# Patient Record
Sex: FEMALE | Race: WHITE | Employment: UNEMPLOYED | URBAN - METROPOLITAN AREA
[De-identification: names, ages, dates, MRNs, and addresses within clinical notes are randomized per-mention and may not be internally consistent; named-entity substitution may affect disease eponyms.]

---

## 2018-09-28 LAB
ANTIBODY SCREEN, EXTERNAL: NEGATIVE
CHLAMYDIA, EXTERNAL: NEGATIVE
HBSAG, EXTERNAL: NEGATIVE
HIV, EXTERNAL: NON REACTIVE
N. GONORRHEA, EXTERNAL: NEGATIVE
RUBELLA, EXTERNAL: NORMAL
T. PALLIDUM, EXTERNAL: NEGATIVE

## 2019-04-10 LAB — GRBS, EXTERNAL: NEGATIVE

## 2019-04-29 ENCOUNTER — APPOINTMENT (OUTPATIENT)
Dept: VASCULAR SURGERY | Age: 34
End: 2019-04-29
Attending: OBSTETRICS & GYNECOLOGY
Payer: COMMERCIAL

## 2019-04-29 ENCOUNTER — HOSPITAL ENCOUNTER (INPATIENT)
Age: 34
LOS: 3 days | Discharge: HOME | End: 2019-05-02
Attending: OBSTETRICS & GYNECOLOGY | Admitting: OBSTETRICS & GYNECOLOGY
Payer: COMMERCIAL

## 2019-04-29 ENCOUNTER — ANESTHESIA EVENT (OUTPATIENT)
Dept: LABOR AND DELIVERY | Age: 34
End: 2019-04-29
Payer: COMMERCIAL

## 2019-04-29 ENCOUNTER — ANESTHESIA (OUTPATIENT)
Dept: LABOR AND DELIVERY | Age: 34
End: 2019-04-29
Payer: COMMERCIAL

## 2019-04-29 DIAGNOSIS — G89.18 POSTOPERATIVE PAIN: Primary | ICD-10-CM

## 2019-04-29 PROBLEM — Z34.90 PREGNANCY: Status: ACTIVE | Noted: 2019-04-29

## 2019-04-29 LAB
ABO + RH BLD: NORMAL
BASOPHILS # BLD: 0 K/UL (ref 0–0.1)
BASOPHILS NFR BLD: 0 % (ref 0–1)
BLOOD GROUP ANTIBODIES SERPL: NORMAL
DAILY QC (YES/NO)?: YES
DIFFERENTIAL METHOD BLD: ABNORMAL
EOSINOPHIL # BLD: 0.1 K/UL (ref 0–0.4)
EOSINOPHIL NFR BLD: 1 % (ref 0–7)
ERYTHROCYTE [DISTWIDTH] IN BLOOD BY AUTOMATED COUNT: 13.5 % (ref 11.5–14.5)
HCT VFR BLD AUTO: 33 % (ref 35–47)
HGB BLD-MCNC: 10.9 G/DL (ref 11.5–16)
IMM GRANULOCYTES # BLD AUTO: 0.1 K/UL (ref 0–0.04)
IMM GRANULOCYTES NFR BLD AUTO: 1 % (ref 0–0.5)
LYMPHOCYTES # BLD: 2.1 K/UL (ref 0.8–3.5)
LYMPHOCYTES NFR BLD: 22 % (ref 12–49)
MCH RBC QN AUTO: 31.2 PG (ref 26–34)
MCHC RBC AUTO-ENTMCNC: 33 G/DL (ref 30–36.5)
MCV RBC AUTO: 94.6 FL (ref 80–99)
MONOCYTES # BLD: 0.8 K/UL (ref 0–1)
MONOCYTES NFR BLD: 9 % (ref 5–13)
NEUTS SEG # BLD: 6.4 K/UL (ref 1.8–8)
NEUTS SEG NFR BLD: 67 % (ref 32–75)
NRBC # BLD: 0 K/UL (ref 0–0.01)
NRBC BLD-RTO: 0 PER 100 WBC
PH, VAGINAL FLUID: 6.5 (ref 5–6.1)
PLATELET # BLD AUTO: 183 K/UL (ref 150–400)
PMV BLD AUTO: 11.5 FL (ref 8.9–12.9)
RBC # BLD AUTO: 3.49 M/UL (ref 3.8–5.2)
SPECIMEN EXP DATE BLD: NORMAL
WBC # BLD AUTO: 9.5 K/UL (ref 3.6–11)

## 2019-04-29 PROCEDURE — 85025 COMPLETE CBC W/AUTO DIFF WBC: CPT

## 2019-04-29 PROCEDURE — 76010000392 HC C SECN EA ADDL 0.5 HR: Performed by: OBSTETRICS & GYNECOLOGY

## 2019-04-29 PROCEDURE — 75410000003 HC RECOV DEL/VAG/CSECN EA 0.5 HR: Performed by: OBSTETRICS & GYNECOLOGY

## 2019-04-29 PROCEDURE — 36415 COLL VENOUS BLD VENIPUNCTURE: CPT

## 2019-04-29 PROCEDURE — 86900 BLOOD TYPING SEROLOGIC ABO: CPT

## 2019-04-29 PROCEDURE — 74011250637 HC RX REV CODE- 250/637: Performed by: OBSTETRICS & GYNECOLOGY

## 2019-04-29 PROCEDURE — 74011000250 HC RX REV CODE- 250

## 2019-04-29 PROCEDURE — 75810000275 HC EMERGENCY DEPT VISIT NO LEVEL OF CARE

## 2019-04-29 PROCEDURE — 77030007866 HC KT SPN ANES BBMI -B: Performed by: ANESTHESIOLOGY

## 2019-04-29 PROCEDURE — 76060000078 HC EPIDURAL ANESTHESIA: Performed by: OBSTETRICS & GYNECOLOGY

## 2019-04-29 PROCEDURE — 74011250636 HC RX REV CODE- 250/636: Performed by: OBSTETRICS & GYNECOLOGY

## 2019-04-29 PROCEDURE — 74011250636 HC RX REV CODE- 250/636

## 2019-04-29 PROCEDURE — 83986 ASSAY PH BODY FLUID NOS: CPT | Performed by: OBSTETRICS & GYNECOLOGY

## 2019-04-29 PROCEDURE — 74011250636 HC RX REV CODE- 250/636: Performed by: ANESTHESIOLOGY

## 2019-04-29 PROCEDURE — 77030027138 HC INCENT SPIROMETER -A

## 2019-04-29 PROCEDURE — 65270000029 HC RM PRIVATE

## 2019-04-29 PROCEDURE — 75410000002 HC LABOR FEE PER 1 HR: Performed by: OBSTETRICS & GYNECOLOGY

## 2019-04-29 PROCEDURE — 76010000391 HC C SECN FIRST 1 HR: Performed by: OBSTETRICS & GYNECOLOGY

## 2019-04-29 PROCEDURE — 93971 EXTREMITY STUDY: CPT

## 2019-04-29 RX ORDER — CEFAZOLIN SODIUM/WATER 2 G/20 ML
2 SYRINGE (ML) INTRAVENOUS
Status: COMPLETED | OUTPATIENT
Start: 2019-04-29 | End: 2019-04-29

## 2019-04-29 RX ORDER — SODIUM CHLORIDE 0.9 % (FLUSH) 0.9 %
5-40 SYRINGE (ML) INJECTION AS NEEDED
Status: DISCONTINUED | OUTPATIENT
Start: 2019-04-29 | End: 2019-04-29 | Stop reason: HOSPADM

## 2019-04-29 RX ORDER — NALOXONE HYDROCHLORIDE 0.4 MG/ML
0.4 INJECTION, SOLUTION INTRAMUSCULAR; INTRAVENOUS; SUBCUTANEOUS AS NEEDED
Status: DISCONTINUED | OUTPATIENT
Start: 2019-04-29 | End: 2019-05-02 | Stop reason: HOSPADM

## 2019-04-29 RX ORDER — CEFAZOLIN SODIUM 1 G/3ML
INJECTION, POWDER, FOR SOLUTION INTRAMUSCULAR; INTRAVENOUS AS NEEDED
Status: DISCONTINUED | OUTPATIENT
Start: 2019-04-29 | End: 2019-04-29 | Stop reason: HOSPADM

## 2019-04-29 RX ORDER — SIMETHICONE 80 MG
80 TABLET,CHEWABLE ORAL AS NEEDED
Status: DISCONTINUED | OUTPATIENT
Start: 2019-04-29 | End: 2019-05-02 | Stop reason: HOSPADM

## 2019-04-29 RX ORDER — SODIUM CHLORIDE 0.9 % (FLUSH) 0.9 %
5-40 SYRINGE (ML) INJECTION EVERY 8 HOURS
Status: DISCONTINUED | OUTPATIENT
Start: 2019-04-29 | End: 2019-05-02 | Stop reason: HOSPADM

## 2019-04-29 RX ORDER — SWAB
1 SWAB, NON-MEDICATED MISCELLANEOUS DAILY
Status: DISCONTINUED | OUTPATIENT
Start: 2019-04-29 | End: 2019-05-02 | Stop reason: HOSPADM

## 2019-04-29 RX ORDER — HYDROMORPHONE HYDROCHLORIDE 2 MG/ML
1 INJECTION, SOLUTION INTRAMUSCULAR; INTRAVENOUS; SUBCUTANEOUS
Status: DISCONTINUED | OUTPATIENT
Start: 2019-04-29 | End: 2019-05-02 | Stop reason: HOSPADM

## 2019-04-29 RX ORDER — SODIUM CHLORIDE, SODIUM LACTATE, POTASSIUM CHLORIDE, CALCIUM CHLORIDE 600; 310; 30; 20 MG/100ML; MG/100ML; MG/100ML; MG/100ML
125 INJECTION, SOLUTION INTRAVENOUS CONTINUOUS
Status: DISCONTINUED | OUTPATIENT
Start: 2019-04-29 | End: 2019-05-02 | Stop reason: HOSPADM

## 2019-04-29 RX ORDER — ONDANSETRON 2 MG/ML
4 INJECTION INTRAMUSCULAR; INTRAVENOUS
Status: DISCONTINUED | OUTPATIENT
Start: 2019-04-29 | End: 2019-05-02 | Stop reason: HOSPADM

## 2019-04-29 RX ORDER — SODIUM CHLORIDE, SODIUM LACTATE, POTASSIUM CHLORIDE, CALCIUM CHLORIDE 600; 310; 30; 20 MG/100ML; MG/100ML; MG/100ML; MG/100ML
INJECTION, SOLUTION INTRAVENOUS
Status: DISCONTINUED | OUTPATIENT
Start: 2019-04-29 | End: 2019-04-29 | Stop reason: HOSPADM

## 2019-04-29 RX ORDER — NALBUPHINE HYDROCHLORIDE 10 MG/ML
5 INJECTION, SOLUTION INTRAMUSCULAR; INTRAVENOUS; SUBCUTANEOUS ONCE
Status: ACTIVE | OUTPATIENT
Start: 2019-04-29 | End: 2019-04-29

## 2019-04-29 RX ORDER — SODIUM CHLORIDE 0.9 % (FLUSH) 0.9 %
5-40 SYRINGE (ML) INJECTION EVERY 8 HOURS
Status: DISCONTINUED | OUTPATIENT
Start: 2019-04-29 | End: 2019-04-29 | Stop reason: HOSPADM

## 2019-04-29 RX ORDER — DOCUSATE SODIUM 100 MG/1
100 CAPSULE, LIQUID FILLED ORAL 2 TIMES DAILY
Status: DISCONTINUED | OUTPATIENT
Start: 2019-04-29 | End: 2019-05-02 | Stop reason: HOSPADM

## 2019-04-29 RX ORDER — SODIUM CHLORIDE, SODIUM LACTATE, POTASSIUM CHLORIDE, CALCIUM CHLORIDE 600; 310; 30; 20 MG/100ML; MG/100ML; MG/100ML; MG/100ML
1000 INJECTION, SOLUTION INTRAVENOUS CONTINUOUS
Status: DISCONTINUED | OUTPATIENT
Start: 2019-04-29 | End: 2019-04-29 | Stop reason: HOSPADM

## 2019-04-29 RX ORDER — NALOXONE HYDROCHLORIDE 0.4 MG/ML
0.2 INJECTION, SOLUTION INTRAMUSCULAR; INTRAVENOUS; SUBCUTANEOUS
Status: DISCONTINUED | OUTPATIENT
Start: 2019-04-29 | End: 2019-05-02 | Stop reason: HOSPADM

## 2019-04-29 RX ORDER — OXYTOCIN/RINGER'S LACTATE 20/1000 ML
125-500 PLASTIC BAG, INJECTION (ML) INTRAVENOUS ONCE
Status: COMPLETED | OUTPATIENT
Start: 2019-04-29 | End: 2019-04-29

## 2019-04-29 RX ORDER — IBUPROFEN 800 MG/1
800 TABLET ORAL EVERY 8 HOURS
Status: DISCONTINUED | OUTPATIENT
Start: 2019-04-29 | End: 2019-05-02 | Stop reason: HOSPADM

## 2019-04-29 RX ORDER — KETOROLAC TROMETHAMINE 30 MG/ML
30 INJECTION, SOLUTION INTRAMUSCULAR; INTRAVENOUS
Status: DISPENSED | OUTPATIENT
Start: 2019-04-29 | End: 2019-04-30

## 2019-04-29 RX ORDER — SODIUM CHLORIDE 0.9 % (FLUSH) 0.9 %
5-40 SYRINGE (ML) INJECTION AS NEEDED
Status: DISCONTINUED | OUTPATIENT
Start: 2019-04-29 | End: 2019-05-02 | Stop reason: HOSPADM

## 2019-04-29 RX ORDER — OXYTOCIN 10 [USP'U]/ML
INJECTION, SOLUTION INTRAMUSCULAR; INTRAVENOUS AS NEEDED
Status: DISCONTINUED | OUTPATIENT
Start: 2019-04-29 | End: 2019-04-29 | Stop reason: HOSPADM

## 2019-04-29 RX ORDER — BUPIVACAINE HYDROCHLORIDE 7.5 MG/ML
INJECTION, SOLUTION EPIDURAL; RETROBULBAR
Status: SHIPPED | OUTPATIENT
Start: 2019-04-29 | End: 2019-04-29

## 2019-04-29 RX ORDER — ACETAMINOPHEN 325 MG/1
650 TABLET ORAL
Status: DISCONTINUED | OUTPATIENT
Start: 2019-04-29 | End: 2019-05-02 | Stop reason: HOSPADM

## 2019-04-29 RX ORDER — DIPHENHYDRAMINE HYDROCHLORIDE 50 MG/ML
12.5 INJECTION, SOLUTION INTRAMUSCULAR; INTRAVENOUS
Status: DISCONTINUED | OUTPATIENT
Start: 2019-04-29 | End: 2019-05-02 | Stop reason: HOSPADM

## 2019-04-29 RX ORDER — ZOLPIDEM TARTRATE 5 MG/1
5 TABLET ORAL
Status: DISCONTINUED | OUTPATIENT
Start: 2019-04-29 | End: 2019-05-02 | Stop reason: HOSPADM

## 2019-04-29 RX ORDER — MORPHINE SULFATE 1 MG/ML
INJECTION, SOLUTION EPIDURAL; INTRATHECAL; INTRAVENOUS
Status: SHIPPED | OUTPATIENT
Start: 2019-04-29 | End: 2019-04-29

## 2019-04-29 RX ORDER — HYDROCODONE BITARTRATE AND ACETAMINOPHEN 5; 325 MG/1; MG/1
1 TABLET ORAL
Status: DISCONTINUED | OUTPATIENT
Start: 2019-04-29 | End: 2019-05-02 | Stop reason: HOSPADM

## 2019-04-29 RX ADMIN — SODIUM CHLORIDE, SODIUM LACTATE, POTASSIUM CHLORIDE, CALCIUM CHLORIDE: 600; 310; 30; 20 INJECTION, SOLUTION INTRAVENOUS at 03:42

## 2019-04-29 RX ADMIN — BUPIVACAINE HYDROCHLORIDE 12 MG: 7.5 INJECTION, SOLUTION EPIDURAL; RETROBULBAR at 03:34

## 2019-04-29 RX ADMIN — SODIUM CHLORIDE, SODIUM LACTATE, POTASSIUM CHLORIDE, CALCIUM CHLORIDE: 600; 310; 30; 20 INJECTION, SOLUTION INTRAVENOUS at 04:23

## 2019-04-29 RX ADMIN — ONDANSETRON 4 MG: 2 INJECTION INTRAMUSCULAR; INTRAVENOUS at 05:15

## 2019-04-29 RX ADMIN — Medication 2500 MILLI-UNITS/HR: at 07:24

## 2019-04-29 RX ADMIN — KETOROLAC TROMETHAMINE 30 MG: 30 INJECTION, SOLUTION INTRAMUSCULAR at 05:23

## 2019-04-29 RX ADMIN — SODIUM CHLORIDE, SODIUM LACTATE, POTASSIUM CHLORIDE, CALCIUM CHLORIDE: 600; 310; 30; 20 INJECTION, SOLUTION INTRAVENOUS at 03:15

## 2019-04-29 RX ADMIN — MORPHINE SULFATE 0.25 MG: 1 INJECTION, SOLUTION EPIDURAL; INTRATHECAL; INTRAVENOUS at 03:34

## 2019-04-29 RX ADMIN — Medication 2 G: at 03:06

## 2019-04-29 RX ADMIN — ONDANSETRON 4 MG: 2 INJECTION INTRAMUSCULAR; INTRAVENOUS at 13:26

## 2019-04-29 RX ADMIN — ONDANSETRON 4 MG: 2 INJECTION INTRAMUSCULAR; INTRAVENOUS at 09:12

## 2019-04-29 RX ADMIN — IBUPROFEN 800 MG: 800 TABLET ORAL at 19:48

## 2019-04-29 RX ADMIN — CEFAZOLIN SODIUM 2 G: 1 INJECTION, POWDER, FOR SOLUTION INTRAMUSCULAR; INTRAVENOUS at 03:15

## 2019-04-29 RX ADMIN — OXYTOCIN 20 UNITS: 10 INJECTION, SOLUTION INTRAMUSCULAR; INTRAVENOUS at 03:52

## 2019-04-29 NOTE — L&D DELIVERY NOTE
Delivery Summary    Patient: Anna Barlow MRN: [id number]  SSN: [id number]    YOB: 1985  Age: 35 y.o. Sex: female        Information for the patient's :  Karely Almodovar [[id number]]       Labor Events:    Labor: No    Steroids:     Cervical Ripening Date/Time:       Cervical Ripening Type:     Antibiotics During Labor:     Rupture Identifier:      Rupture Date/Time: 2019 12:30 AM   Rupture Type: SROM   Amniotic Fluid Volume: Moderate    Amniotic Fluid Description: Clear    Amniotic Fluid Odor: None    Induction: None       Induction Date/Time:        Indications for Induction:      Augmentation: None   Augmentation Date/Time:      Indications for Augmentation:     Labor complications: Additional complications:        Delivery Events:  Indications For Episiotomy:     Episiotomy: None   Perineal Laceration(s): None   Repaired:     Periurethral Laceration Location:      Repaired:     Labial Laceration Location:     Repaired:     Sulcal Laceration Location:     Repaired:     Vaginal Laceration Location:     Repaired:     Cervical Laceration Location:     Repaired:     Repair Suture:     Number of Repair Packets:     Estimated Blood Loss (ml):  ml     Delivery Date: 2019    Delivery Time: 3:52 AM   Delivery Type: , Low Transverse     Details    Trial of Labor: No   Primary/Repeat: Repeat   Priority: Routine   Indications:          Sex:  Male     Gestational Age: 39w0d  Delivery Clinician:  Mercedez Smith  Living Status: Living   Delivery Location: OR L&D OR1          APGARS  One minute Five minutes Ten minutes   Skin color:            Heart rate:            Grimace:            Muscle tone:            Breathing: Totals:              Presentation: Vertex    Position:   Occiput Transverse  Resuscitation Method:        Meconium Stained:        Cord Information:    Complications:    Cord around:    Delayed cord clamping?     Cord clamped date/time:   Disposition of Cord Blood:      Blood Gases Sent?:      Placenta:  Date/Time:    Removal:        Appearance:        Measurements:  Birth Weight: 4.025 kg      Birth Length: 50.8 cm      Head Circumference: 36.5 cm      Chest Circumference: 34.5 cm     Abdominal Girth: 33.5 cm    Other Providers:   ASHLEY PEDROZA;MATTHEW MORRISON;JOSELIN GARZA;MATTHEW GALICIA;NEGRITA JUAREZ;CARTER BARRERA, Obstetrician;Primary Nurse;Primary  Nurse;Scrub Tech; Anesthesiologist;Scrub Tech             Group B Strep:   Lab Results   Component Value Date/Time    GrBStrep, External Negative 04/10/2019     Information for the patient's :  Jimenez Pratik, Male Bala Latus [[id number]]   No results found for: ABORH, PCTABR, PCTDIG, BILI, ABORHEXT, ABORH    No results for input(s): PCO2CB, PO2CB, HCO3I, SO2I, IBD, PTEMPI, SPECTI, PHICB, ISITE, IDEV, IALLEN in the last 72 hours. See dictated operative note.

## 2019-04-29 NOTE — ROUTINE PROCESS
Bedside and Verbal shift change report given to Kolby Wilkerson RN (oncoming nurse) by Andrew Gusman RN (offgoing nurse). Report included the following information SBAR, Intake/Output, MAR and Recent Results.

## 2019-04-29 NOTE — PROGRESS NOTES
Post-Operative  Day 0    Samina Ivory     Assessment: Post-Op day 0, stable.  @39wks, RLTCS. +Rh, Rubella Immune. NO Circ. Hx Uterine prolapse w/ G2. Plan:   1. Routine post-operative care       Information for the patient's :  Blanche Ha Male Stacy Mason [[id number]]   , Low Transverse    Patient doing well without significant complaint. Some continued nausea relieved w/ Zofran. Vomiting resolved, tolerating liquids, no flatus, sen in place. Vitals:  Visit Vitals  /66   Pulse 73   Temp 98.2 °F (36.8 °C)   Resp 16   Ht 5' 7\" (1.702 m)   Wt 73.5 kg (162 lb)   SpO2 100%   Breastfeeding? Unknown   BMI 25.37 kg/m²     Temp (24hrs), Av.3 °F (36.8 °C), Min:97.8 °F (36.6 °C), Max:98.6 °F (37 °C)      Last 24hr Input/Output:    Intake/Output Summary (Last 24 hours) at 2019 0758  Last data filed at 2019 0543  Gross per 24 hour   Intake 2000 ml   Output 225 ml   Net 1775 ml          Exam:        Patient without distress. Lungs clear. Abdomen, bowel sounds present, soft, expected tenderness, fundus firm Wound dressing intact     Perineum normal lochia noted               Lower extremities are negative for swelling, cords or tenderness.     Labs:   Lab Results   Component Value Date/Time    WBC 9.5 2019 02:23 AM    WBC 14.0 (H) 2016 02:56 AM    WBC 10.7 2016 08:30 AM    WBC 8.3 2015 11:24 AM    WBC 9.1 2013 01:11 AM    HGB 10.9 (L) 2019 02:23 AM    HGB 10.8 (L) 2016 02:56 AM    HGB 11.3 (L) 2016 08:30 AM    HGB 12.4 2015 11:24 AM    HGB 11.9 2013 01:11 AM    HCT 33.0 (L) 2019 02:23 AM    HCT 32.9 (L) 2016 02:56 AM    HCT 33.8 (L) 2016 08:30 AM    HCT 38.2 2015 11:24 AM    HCT 35.2 2013 01:11 AM    PLATELET 402  02:23 AM    PLATELET 980  02:56 AM    PLATELET 676  08:30 AM    PLATELET 114 12/10/3895 11:24 AM    PLATELET 894  01:11 AM       Recent Results (from the past 24 hour(s))   PH BY NITRAZINE, POC    Collection Time: 04/29/19  1:38 AM   Result Value Ref Range    pH-Nitrazine paper 6.5 (A) 5.0 - 6.1    Daily QC performed? Yes    CBC WITH AUTOMATED DIFF    Collection Time: 04/29/19  2:23 AM   Result Value Ref Range    WBC 9.5 3.6 - 11.0 K/uL    RBC 3.49 (L) 3.80 - 5.20 M/uL    HGB 10.9 (L) 11.5 - 16.0 g/dL    HCT 33.0 (L) 35.0 - 47.0 %    MCV 94.6 80.0 - 99.0 FL    MCH 31.2 26.0 - 34.0 PG    MCHC 33.0 30.0 - 36.5 g/dL    RDW 13.5 11.5 - 14.5 %    PLATELET 138 791 - 617 K/uL    MPV 11.5 8.9 - 12.9 FL    NRBC 0.0 0  WBC    ABSOLUTE NRBC 0.00 0.00 - 0.01 K/uL    NEUTROPHILS 67 32 - 75 %    LYMPHOCYTES 22 12 - 49 %    MONOCYTES 9 5 - 13 %    EOSINOPHILS 1 0 - 7 %    BASOPHILS 0 0 - 1 %    IMMATURE GRANULOCYTES 1 (H) 0.0 - 0.5 %    ABS. NEUTROPHILS 6.4 1.8 - 8.0 K/UL    ABS. LYMPHOCYTES 2.1 0.8 - 3.5 K/UL    ABS. MONOCYTES 0.8 0.0 - 1.0 K/UL    ABS. EOSINOPHILS 0.1 0.0 - 0.4 K/UL    ABS. BASOPHILS 0.0 0.0 - 0.1 K/UL    ABS. IMM.  GRANS. 0.1 (H) 0.00 - 0.04 K/UL    DF AUTOMATED     TYPE & SCREEN    Collection Time: 04/29/19  2:23 AM   Result Value Ref Range    Crossmatch Expiration 05/02/2019     ABO/Rh(D) Aleisha Stai POSITIVE     Antibody screen NEG      Olesya Ramirez NP

## 2019-04-29 NOTE — PROGRESS NOTES
Nurse notified pt that MD wants her to keep her SCD's on while in bed and overnight. MD also wants pt to wear JEVON hose. Pt  Refused SCD's and JEVON hose. She states she doesn't like the SCD's because they make her legs \"itchy\" and so will the JEVON hose. JEVON hose placed at pts bedside and she will let nurse know if she changes her mind.

## 2019-04-29 NOTE — PROGRESS NOTES
Nurse called and spoke to Dr. Edward Schulz, notified him that preliminary doppler results are negative. OK to get pt OOB. Pt to use SCD's overnight, order for JEVON hose received.

## 2019-04-29 NOTE — ROUTINE PROCESS
TRANSFER - IN REPORT:    Verbal report received from Emelia Stephens RN(name) on Princess Maldonado  being received from L&D(unit) for routine progression of care      Report consisted of patients Situation, Background, Assessment and   Recommendations(SBAR). Information from the following report(s) SBAR, Intake/Output and MAR was reviewed with the receiving nurse. Opportunity for questions and clarification was provided. Assessment completed upon patients arrival to unit and care assumed. Pt received to room #315 via stretcher with infant and her family.

## 2019-04-29 NOTE — H&P
History & Physical    Name: Duc Hunter MRN: [id number]  SSN: [id number]    YOB: 1985  Age: 35 y.o. Sex: female        Subjective:     Estimated Date of Delivery: 19  OB History    Para Term  AB Living   3 2 2     2   SAB TAB Ectopic Molar Multiple Live Births           0 2      # Outcome Date GA Lbr Madi/2nd Weight Sex Delivery Anes PTL Lv   3 Current            2 Term 16 39w0d  3.742 kg F  LO SPINAL AN N EVANGELISTA   1 Term 13 39w0d  3.52 kg Rico Crest AN  EVANGELISTA      Obstetric Comments   Dr. Diaz [facility]       Ms. Mary Lou Gooden is a G3 [phone] with pregnancy at 39w0d h/o previous  section with leakage of vaginal fluid that started at 0030 and irregular uterine contractions q 10-14 minutes. Light spotting after SVE. She is scheduled for a repeat  today at 0930. Prenatal course is complicated by h/o previous , uterine prolapse with her second pregnancy requiring a pessary. She has been doing physical therapy with this pregnancy and has not had any issues with prolapse. and is scheduled for a repeat  today at 0930. Mentions that she would have considered TOLAC if she were 8+ centimeters but otherwise desires . She's a retired NICU nurse and desires not want to wait until 0930 as she is wants to make sure that her  is performed in a controlled environment. Please see prenatal records for details. Past Medical History:   Diagnosis Date    Tachycardia     Unspecified breast disorder 2015    lumpectomy  right breast cyst    Unspecified breast disorder     axillary node dissection    Unspecified breast disorder     left breast biopsy    Unspecified breast disorder     BRCA test negative    Uterine prolapse affecting pregnancy in second trimester, antepartum 9/3/2015    1 degree cystocele and descent of cervix to introitus with prolonged standing and straining.  Fitted with #4 Ring pessary with support     Past Surgical History:   Procedure Laterality Date    BREAST SURGERY PROCEDURE UNLISTED      HX ACL RECONSTRUCTION      HX  SECTION      HX TONSILLECTOMY       Social History     Occupational History    Occupation: Stay at home mom     Comment: retired NICU nurse   Tobacco Use    Smoking status: Never Smoker    Smokeless tobacco: Never Used   Substance and Sexual Activity    Alcohol use: No    Drug use: Never    Sexual activity: Yes     Partners: Male     Birth control/protection: None     Family History   Problem Relation Age of Onset    Breast Cancer Mother 45         age 36    Breast Cancer Maternal Grandmother 36        reoccured 2-3 times    Other Father 36        parkinsons    Other Brother         bipolar    Hypertension Maternal Grandfather 46        MI    Diabetes Paternal Grandmother 66        unsure of diabete type; liver disease       Allergies   Allergen Reactions    Shellfish Derived Anaphylaxis    Codeine Sulfate Hives     Pt states she is not allergic      Morphine Hives     Pt states NOT an allergy    Oxycodone Itching     Pt states NOT allergic    Soy Other (comments)     GI upset     Prior to Admission medications    Medication Sig Start Date End Date Taking? Authorizing Provider   LACTOBACILLUS ACIDOPHILUS (PROBIOTIC PO) Take  by mouth. Yes Provider, Historical   loratadine (CLARITIN) 10 mg tablet Take 10 mg by mouth daily,   Yes Provider, Historical   prenatal vit-iron fumarate-fa (PRENATAL VITAMIN WITH MINERALS) 28-0.8 mg tab Take 1 tablet by mouth daily,   Yes Provider, Historical        Review of Systems: Pertinent items are noted in HPI. Objective:     Vitals:  Vitals:    19 0120 19 0203   BP: 123/64    Pulse: (!) 104    Resp: 20    Temp: 98.6 °F (37 °C)    SpO2: 98%    Weight:  73.5 kg (162 lb)   Height:  5' 7\" (1.702 m)        Physical Exam:  Patient without distress.   Heart: Regular rate and rhythm or S1S2 present  Lung: clear to auscultation throughout lung fields, no wheezes, no rales, no rhonchi and normal respiratory effort  Abdomen: soft, nontender, gravid  Fundus: soft and non tender  Perineum: blood absent, amniotic fluid present  Cervical Exam: 2/50/-3 vtx (exam by nurse)  Lower Extremities:  - Edema No  Membranes:  Spontaneous Rupture of Membranes; Amniotic Fluid: clear fluid  Fetal Heart Rate: Baseline: 140 per minute  Variability: moderate  Accelerations: yes  Decelerations: none  Uterine contractions: irregular, every 10-14 minutes    Prenatal Labs:   Lab Results   Component Value Date/Time    ABO/Rh(D) O POSITIVE 2016 08:30 AM    Rubella, External Immune 2018    GrBStrep, External Negative 04/10/2019    HBsAg, External Negative 2018    HIV, External Non reactive 2018    RPR, External Negative 2012    Gonorrhea, External Negative 2018    Chlamydia, External Negative 2018    ABO,Rh o positive 2012         Assessment/Plan:      Ass:  at 44 wks with h/o previous  desiring repeat  with SROM, cat 1 fetal tracing    Plan: Proceed with repeat   Risks of  section discussed included bleeding with the possible need for blood products; injury to the broad ligament, bladder, ureters, and/or bowel; endometritis; wound infection; and thromboembolism. The patient voiced understanding and is accepting of these risks.

## 2019-04-29 NOTE — PROGRESS NOTES
12Jim Saavedra MD at bedside to assess pt. MD gives pt choice to wait for Love or move forward. Pt requests to move forward at this time. Risks and benefits of procedure discussed. Pt verbalizes understanding and agrees. 5943: Pt in OR.     0500: Pt back in room. QBL at delivery: 290mL     0623Dapablo Saavedra MD called regarding quarter sized clot expressed during fundal exam. No further orders received at this time. Continue to monitor. 1023: Bedside and Verbal shift change report given to RITO Singh RN (oncoming nurse) by Shirley Hathaway RN (offgoing nurse). Report included the following information SBAR, Kardex, Intake/Output, MAR, Accordion, Recent Results and Med Rec Status.

## 2019-04-29 NOTE — OP NOTES
[facility]urt[facility] Parmar [facility] 79  OPERATIVE REPORT    Name:  Andie Hensley  MR#:  [id number]  :  1985  ACCOUNT #:  [de-identified]  DATE OF SERVICE:  2019      PREOPERATIVE DIAGNOSES:  A 22-year-old  3, para 2-0-0-2 at 44 weeks' gestation with history of previous  section desiring repeat  section, spontaneous rupture of membranes. POSTOPERATIVE DIAGNOSES:  A 22-year-old  3, para 2-0-0-2 at 44 weeks' gestation with history of previous  section desiring repeat  section, spontaneous rupture of membranes. Term live male infant, large for gestational age fetus. PROCEDURE PERFORMED:  Repeat lower segment transverse  section. SURGEON:  Marta Hutchison MD    ASSISTANT:  Wyline Crigler    ANESTHESIA:  Spinal.    ANESTHESIOLOGIST:  Dwaine Burks MD    COMPLICATIONS:  none    SPECIMENS REMOVED:  placenta        ESTIMATED BLOOD LOSS:  600 mL    IV FLUIDS:  2 liters of crystalloid    URINE OUTPUT:  100 mL    FINDINGS:  Term live male infant in right occiput transverse presentation, also noted to have a compound presentation with the left hand along the side of the face. The Apgars were 9 and 9 and the infant weighed 8 pounds 14 ounces. The amniotic fluid was clear and the placenta was of grossly normal appearance. The patient's uterus and bilateral adnexa were of normal appearance. PROCEDURE:  The patient was taken to the operating room where spinal anesthesia was administered. The patient was prepped and draped in a sterile fashion. A surgical timeout was performed and the patient's identification and procedure were confirmed. An Kaci Sheppard test was performed and the patient was noted to have adequate anesthesia. A Pfannenstiel incision was made with the scalpel and carried down to the underlying layer of fascia using the scalpel.   The scalpel was used to score the fascia in the midline and the fascial incision was extended laterally with the Wilburn scissors. The anterior aspect of the fascial incision was grasped with Kocher clamps and the underlying rectus muscles were dissected down using the scalpel and blunt dissection. The Kocher clamps were placed on the inferior aspect of the fascial incision and the underlying rectus muscles were dissected down using the Wilburn scissors. There were adhesions between the rectus bellies that were carefully dissected using the scalpel. There was already noted to be a small opening in the peritoneum that was digitally inspected and no adhesions were palpated. The peritoneal opening was extended superiorly and inferiorly using the Bovie. The peritoneal opening was manually stretched and a bladder blade was inserted. The bladder was noted to be densely adhered to the lower uterine segment. The scalpel was used to make a small horizontal incision in the midportion of the lower uterine segment just above the bladder reflection. The hysterotomy was extended superiorly and inferiorly. An Allis clamp was used to perform an amniotomy of the forebag. The infant's head was elevated into the uterine incision. Fundal pressure was applied and the infant's head and torso were delivered atraumatically. The infant's nose and mouth were bulb suctioned and delayed cord clamping was performed for 30 seconds. The umbilical cord was clamped and cut and the infant was handed off to the waiting nurse. Cord blood was collected and the placenta was delivered by gentle traction on the umbilical cord and uterine massage. The uterus was exteriorized and the uterine cavity was cleared of blood and remaining debris. The uterine incision was reapproximated with a running lock suture of 0 Monocryl and a second suture of 0 Monocryl was used to perform an imbricating stitch. There was good hemostasis seen along the uterine closure. The patient's posterior cul-de-sac was suctioned.   The uterus was placed back into the patient's pelvis. The paracolic gutter was cleared of blood and clots. The patient's pelvis was thoroughly irrigated and good hemostasis was seen throughout. Seprafilm was placed over the uterine incision and over the anterior uterine body. The peritoneum along with the rectus bellies were reapproximated with a running suture of 2-0 Vicryl. The fascia was reapproximated with a running suture of 0 PSD and the skin was closed with a subcuticular stitch of 4-0 Monocryl. Laparotomy pad, needle, and instrument counts were correct x2. The patient received 2 grams of Ancef preoperatively and was taken to the Labor and Delivery room in stable condition.       Ynes Bazzi MD      [id number]/  D:  04/29/2019 4:54  T:  04/29/2019 14:37  JOB #:  [id number]  CC:  Richa Diez MD

## 2019-04-29 NOTE — ANESTHESIA PREPROCEDURE EVALUATION
Relevant Problems No relevant active problems Anesthetic History No history of anesthetic complications Review of Systems / Medical History Patient summary reviewed, nursing notes reviewed and pertinent labs reviewed Pulmonary Within defined limits Neuro/Psych Within defined limits Cardiovascular Within defined limits GI/Hepatic/Renal 
Within defined limits Endo/Other Within defined limits Other Findings Physical Exam 
 
Airway Mallampati: II 
TM Distance: 4 - 6 cm Neck ROM: normal range of motion Mouth opening: Normal 
 
 Cardiovascular Rhythm: regular Rate: normal 
 
 
 
 Dental 
No notable dental hx Pulmonary Breath sounds clear to auscultation Abdominal 
 
 
 
 Other Findings Anesthetic Plan ASA: 2 Anesthesia type: spinal 
 
 
Post-op pain plan if not by surgeon: intrathecal opiates Anesthetic plan and risks discussed with: Patient

## 2019-04-29 NOTE — LACTATION NOTE
This note was copied from a baby's chart. Mother states baby breast fed well after her . She breast fed her 2 older daughters for 22 months without any difficulty. LC discussed the following:    Pt will successfully establish breastfeeding by feeding in response to infant's early feeding cues and/or to offer breast every 2-3 hours. Ways to obtain a deep latch and seek comfortable positioning shared, aware to keep log of feedings/output. Encouraged mom to attempt feeding with baby led feeding cues. Just as sucking on fingers, rooting, mouthing. Looking for 8-12 feedings in 24 hours. Don't limit baby at breast, allow baby to come of breast on it's own. Baby may want to feed  often and may increase number of feedings on second day of life. Skin to skin encouraged. If baby doesn't nurse,  Mom should  hand express  10-20 drops of colostrum and drip into baby's mouth, or give to baby by finger feeding, cup feeding, or spoon feeding at least every 2-3 hours. Discussed with mother her plan for feeding. Reviewed the benefits of exclusive breast milk feeding during the hospital stay. Informed her of the risks of using formula to supplement in the first few days of life as well as the benefits of successful breast milk feeding; referred her to the Breastfeeding booklet about this information. She acknowledges understanding of information reviewed and states that it is her plan to breastfeed her infant. Will support her choice and offer additional information as needed. Hand Expression Education:  Mom taught how to manually hand express her colostrum. Emphasized the importance of providing infant with valuable colostrum as infant rests skin to skin at breast.  Aware to avoid extended periods of non-feeding. Aware to offer 10-20+ drops of colostrum every 2-3 hours until infant is latching and nursing effectively.   Taught the rationale behind this low tech but highly effective evidence based practice. Pt will successfully establish breastfeeding by feeding in response to early feeding cues   or wake every 3h, will obtain deep latch, and will keep log of feedings/output. Taught to BF at hunger cues and or q 2-3 hrs and to offer 10-20 drops of hand expressed colostrum at any non-feeds. Breast Assessment  Left Breast: Medium  Left Nipple: Everted, Intact  Right Breast: Medium  Right Nipple: Everted, Intact  Breast- Feeding Assessment  Attends Breast-Feeding Classes: No  Breast-Feeding Experience: Yes(Breast fed 2 older children x 22 months each)  Breast Trauma/Surgery: No  Type/Quality: Good  Lactation Consultant Visits  Breast-Feedings: (Mother states baby last brest fed baby at 46 for 35 minutes. Instructed mother to call [phone] [facility] when baby breastfeeds again.)    Mother given breastfeeding handbook and LC#.

## 2019-04-29 NOTE — ANESTHESIA PROCEDURE NOTES
Spinal Block Start time: 4/29/2019 3:15 AM 
End time: 4/29/2019 3:20 AM 
Performed by: Toribio Anton MD 
Authorized by: Toribio Anton MD  
 
Pre-procedure: Indications: at surgeon's request and primary anesthetic  Preanesthetic Checklist: patient identified, risks and benefits discussed, anesthesia consent and timeout performed Spinal Block:  
Patient Position:  Seated Prep Region:  Lumbar Prep: chlorhexidine Location:  L3-4 Technique:  Single shot Needle:  
Needle Type:  Pencan Needle Gauge:  25 G Attempts:  1 Events: CSF confirmed, no blood with aspiration and no paresthesia Assessment: 
Insertion:  Uncomplicated Patient tolerance:  Patient tolerated the procedure well with no immediate complications

## 2019-04-29 NOTE — PROGRESS NOTES
CTSP By RN Due to PT C/o acute onset Left calf pain. She is POD0 s/p  section with an apparent h/o MTFR mutation. On Exam Alert and Oriented x 3 NAD VSS Afebrile  Minimal Calf tenderness and little edema  Given Pregnancy risk and added potential risk due to MTFR in conjunction with calf tenderness. Will obtain Left Lower extremity dopplers to r/o DVT.

## 2019-04-29 NOTE — PROGRESS NOTES
Pt seen in social capacity. She was scheduled for a RLTCS today at 0930 but presented with ROM after midnight and did not want to her delay her case until the morning. C/s was performed without event overnight by Dr. Valeria Masterson. This morning she is feeling well, no issues or concerns. Medically doing well, VS WNL.         Jay Conteh MD

## 2019-04-29 NOTE — PROGRESS NOTES
Tech that is do to doppler called and stated that since pt is only c/o pain in the left calf insurance will not cover the right leg.    1152 Nurse spoke to Dr. Michael Becker notified him of above information.   Order changed to do doppler of left leg only per MD.

## 2019-04-29 NOTE — PROGRESS NOTES
0685-Bedside report received from ROSALVA Hobson RN. POC discussed with pt at this time. Pt verbalized understanding and states she needs nothing. Pt instructed that writer will be back in the next thirty minutes to perform assessment and obtain her VS.    0720-QBL at 2hrs post partum 77  3970-TRANSFER - OUT REPORT:    Verbal report given to Yael Boykin RN(name) on Anna Barlow  being transferred to MIU(unit) for routine progression of care       Report consisted of patients Situation, Background, Assessment and   Recommendations(SBAR). Information from the following report(s) SBAR, Kardex, OR Summary, Procedure Summary, Intake/Output, MAR, Recent Results and Med Rec Status was reviewed with the receiving nurse. Lines:       Opportunity for questions and clarification was provided.       Patient transported with:   Registered Nurse

## 2019-04-29 NOTE — PROGRESS NOTES
Nurse went to round on pt, pt c/o left calf pain when she flexes her foot. She rates the pain of \"1-2 achy feeling\". Bilateral lower extremity SCD's are in place at this time. Nurse assessed calf no redness or swelling noted to either calf. 1100 Nurse called and spoke to [facility] NP who saw pt this morning. Informed her of pts complaint of calf pain. Also notified her of pt having a pulse of 54 upon admission to unit, now pulse is 64. Nurse told to call Dr. Uri Molina a hospitalist who is covering for their group today. [id number] Nurse called and spoke to Dr. Chely Reina, notified him of above note. Order received for bilateral lower extremity dopplers.

## 2019-04-29 NOTE — PROGRESS NOTES
Pt states that her nausea has resolved and she wants to be placed on a regular diet. Diet changed per pt request, nurse encouraged pt to order something light, pt has been eating crackers and tolerating them well.

## 2019-04-29 NOTE — PROGRESS NOTES
Pt notified that they will be doing bilateral dopplers for her lower extremities, pt verbalized understanding,  at bedside.

## 2019-04-30 LAB
BASOPHILS # BLD: 0 K/UL (ref 0–0.1)
BASOPHILS NFR BLD: 0 % (ref 0–1)
DIFFERENTIAL METHOD BLD: ABNORMAL
EOSINOPHIL # BLD: 0.1 K/UL (ref 0–0.4)
EOSINOPHIL NFR BLD: 1 % (ref 0–7)
ERYTHROCYTE [DISTWIDTH] IN BLOOD BY AUTOMATED COUNT: 13.3 % (ref 11.5–14.5)
HCT VFR BLD AUTO: 33.2 % (ref 35–47)
HGB BLD-MCNC: 11 G/DL (ref 11.5–16)
IMM GRANULOCYTES # BLD AUTO: 0.1 K/UL (ref 0–0.04)
IMM GRANULOCYTES NFR BLD AUTO: 1 % (ref 0–0.5)
LYMPHOCYTES # BLD: 1.4 K/UL (ref 0.8–3.5)
LYMPHOCYTES NFR BLD: 12 % (ref 12–49)
MCH RBC QN AUTO: 31.5 PG (ref 26–34)
MCHC RBC AUTO-ENTMCNC: 33.1 G/DL (ref 30–36.5)
MCV RBC AUTO: 95.1 FL (ref 80–99)
MONOCYTES # BLD: 1 K/UL (ref 0–1)
MONOCYTES NFR BLD: 8 % (ref 5–13)
NEUTS SEG # BLD: 9.7 K/UL (ref 1.8–8)
NEUTS SEG NFR BLD: 78 % (ref 32–75)
NRBC # BLD: 0 K/UL (ref 0–0.01)
NRBC BLD-RTO: 0 PER 100 WBC
PLATELET # BLD AUTO: 173 K/UL (ref 150–400)
PMV BLD AUTO: 11 FL (ref 8.9–12.9)
RBC # BLD AUTO: 3.49 M/UL (ref 3.8–5.2)
WBC # BLD AUTO: 12.4 K/UL (ref 3.6–11)

## 2019-04-30 PROCEDURE — 85025 COMPLETE CBC W/AUTO DIFF WBC: CPT

## 2019-04-30 PROCEDURE — 74011250637 HC RX REV CODE- 250/637: Performed by: OBSTETRICS & GYNECOLOGY

## 2019-04-30 PROCEDURE — 36415 COLL VENOUS BLD VENIPUNCTURE: CPT

## 2019-04-30 PROCEDURE — 65270000029 HC RM PRIVATE

## 2019-04-30 RX ADMIN — IBUPROFEN 800 MG: 800 TABLET ORAL at 05:11

## 2019-04-30 RX ADMIN — IBUPROFEN 800 MG: 800 TABLET ORAL at 12:58

## 2019-04-30 RX ADMIN — HYDROCODONE BITARTRATE AND ACETAMINOPHEN 1 TABLET: 5; 325 TABLET ORAL at 21:19

## 2019-04-30 RX ADMIN — IBUPROFEN 800 MG: 800 TABLET ORAL at 21:19

## 2019-04-30 RX ADMIN — HYDROCODONE BITARTRATE AND ACETAMINOPHEN 1 TABLET: 5; 325 TABLET ORAL at 06:53

## 2019-04-30 RX ADMIN — Medication 1 TABLET: at 11:22

## 2019-04-30 RX ADMIN — HYDROCODONE BITARTRATE AND ACETAMINOPHEN 1 TABLET: 5; 325 TABLET ORAL at 11:22

## 2019-04-30 NOTE — LACTATION NOTE
This note was copied from a baby's chart. Mother states baby has been breastfeeding well and frequently. Mother's milk is not in yet - mother reassured that this is normal at this time and that the milk may take 48 to 72 hrs. To come in . [address] [facility] discussed the following:     Discussed what to do if nipples become sore. Care for sore/tender nipples discussed:  ways to improve positioning and latch practiced and discussed, hand express colostrum after feedings and let air dry, light application of lanolin, hydrogel pads, seek comfortable laid back feeding position, start feedings on least sore side first.    Reviewed risks associated with introducing an artificial nipple or pacifier and encouraged mother to wait  until breastfeeding is well established ( AAP guidelines suggest waiting until one month of age). Discussed that using artificial nipples may cause ineffective sucking at breast  in the , decreased breast stimulation/lowered breast milk production and  breastfeeding difficulties. Pt will successfully establish breastfeeding by feeding in response to early feeding cues   or wake every 3h, will obtain deep latch, and will keep log of feedings/output. Taught to BF at hunger cues and or q 2-3 hrs and to offer 10-20 drops of hand expressed colostrum at any non-feeds.       Breast Assessment  Left Breast: Medium  Left Nipple: Everted, Intact  Right Breast: Medium  Right Nipple: Everted, Intact  Breast- Feeding Assessment  Attends Breast-Feeding Classes: No  Breast-Feeding Experience: Yes  Breast Trauma/Surgery: No  Type/Quality: Good(Mother states baby has been breastfeeding frequently)  Lactation Consultant Visits  Breast-Feedings: Good (Baby was latched on well to right breast and nursing vigorously during visit. )  Mother/Infant Observation  Mother Observation: Alignment, Breast comfortable, Close hold, Holds breast, Recognizes feeding cues  Infant Observation: Audible swallows, Feeding cues, Latches nipple and aereolae, Lips flanged, upper, Lips flanged, lower, Opens mouth, Rhythmic suck  LATCH Documentation  Latch: Grasps breast, tongue down, lips flanged, rhythmic sucking  Audible Swallowing: Spontaneous and intermittent (24 hours old)  Type of Nipple: Everted (after stimulation)  Comfort (Breast/Nipple): Soft/non-tender  Hold (Positioning): No assist from staff, mother able to position/hold infant  LATCH Score: 10

## 2019-04-30 NOTE — PROGRESS NOTES
Post-Operative  Day 1    Luke Arellano     Assessment: Post-Op day 1 s/p RLTCS due to history of uterine prolapse, stable    Mild chronic anemia of pregnancy: Hgb 10.9 to 11.0 post-op, VS WNL    L calf pain on POD 0: dopplers yesterday  negative for DVT    Plan:   1. Routine post-operative care   2. Declines circumcision    Information for the patient's :  Rachael Logan [facility] [[id number]]   , Low Transverse     S:  Patient doing well without significant complaint. Nausea and vomiting resolved, tolerating liquids and solid food, no flatus, sen removed yesterday. Vitals:  Visit Vitals  /59 (BP 1 Location: Left arm, BP Patient Position: At rest)   Pulse 65   Temp 98.2 °F (36.8 °C)   Resp 16   Ht 5' 7\" (1.702 m)   Wt 73.5 kg (162 lb)   SpO2 97%   Breastfeeding? Unknown   BMI 25.37 kg/m²     Temp (24hrs), Av.1 °F (36.7 °C), Min:97.8 °F (36.6 °C), Max:98.2 °F (36.8 °C)      Last 24hr Input/Output:    Intake/Output Summary (Last 24 hours) at 2019 0758  Last data filed at 2019 1755  Gross per 24 hour   Intake --   Output 2800 ml   Net -2800 ml          Exam:        Patient without distress. Lungs clear. Abdomen, bowel sounds present, soft, expected tenderness, fundus firm Wound dressing intact     Perineum normal lochia noted               Lower extremities are negative for swelling, cords or tenderness.     Labs:   Lab Results   Component Value Date/Time    WBC 12.4 (H) 2019 02:36 AM    WBC 9.5 2019 02:23 AM    WBC 14.0 (H) 2016 02:56 AM    WBC 10.7 2016 08:30 AM    WBC 8.3 2015 11:24 AM    WBC 9.1 2013 01:11 AM    HGB 11.0 (L) 2019 02:36 AM    HGB 10.9 (L) 2019 02:23 AM    HGB 10.8 (L) 2016 02:56 AM    HGB 11.3 (L) 2016 08:30 AM    HGB 12.4 2015 11:24 AM    HGB 11.9 2013 01:11 AM    HCT 33.2 (L) 2019 02:36 AM    HCT 33.0 (L) 2019 02:23 AM    HCT 32.9 (L) 2016 02:56 AM    HCT 33.8 (L) 02/27/2016 08:30 AM    HCT 38.2 08/04/2015 11:24 AM    HCT 35.2 04/01/2013 01:11 AM    PLATELET 281 41/12/5240 02:36 AM    PLATELET 739 22/50/4148 02:23 AM    PLATELET 149 04/96/5695 02:56 AM    PLATELET 890 25/97/4998 08:30 AM    PLATELET 339 27/28/6965 11:24 AM    PLATELET 467 86/54/9787 01:11 AM       Recent Results (from the past 24 hour(s))   CBC WITH AUTOMATED DIFF    Collection Time: 04/30/19  2:36 AM   Result Value Ref Range    WBC 12.4 (H) 3.6 - 11.0 K/uL    RBC 3.49 (L) 3.80 - 5.20 M/uL    HGB 11.0 (L) 11.5 - 16.0 g/dL    HCT 33.2 (L) 35.0 - 47.0 %    MCV 95.1 80.0 - 99.0 FL    MCH 31.5 26.0 - 34.0 PG    MCHC 33.1 30.0 - 36.5 g/dL    RDW 13.3 11.5 - 14.5 %    PLATELET 137 505 - 747 K/uL    MPV 11.0 8.9 - 12.9 FL    NRBC 0.0 0  WBC    ABSOLUTE NRBC 0.00 0.00 - 0.01 K/uL    NEUTROPHILS 78 (H) 32 - 75 %    LYMPHOCYTES 12 12 - 49 %    MONOCYTES 8 5 - 13 %    EOSINOPHILS 1 0 - 7 %    BASOPHILS 0 0 - 1 %    IMMATURE GRANULOCYTES 1 (H) 0.0 - 0.5 %    ABS. NEUTROPHILS 9.7 (H) 1.8 - 8.0 K/UL    ABS. LYMPHOCYTES 1.4 0.8 - 3.5 K/UL    ABS. MONOCYTES 1.0 0.0 - 1.0 K/UL    ABS. EOSINOPHILS 0.1 0.0 - 0.4 K/UL    ABS. BASOPHILS 0.0 0.0 - 0.1 K/UL    ABS. IMM.  GRANS. 0.1 (H) 0.00 - 0.04 K/UL    DF AUTOMATED

## 2019-04-30 NOTE — ROUTINE PROCESS
Bedside and Verbal shift change report given to Cheryl Walker RN (oncoming nurse) by Darlyn Ron RN (offgoing nurse). Report included the following information SBAR, Kardex and MAR.

## 2019-04-30 NOTE — ROUTINE PROCESS
0710: Bedside and Verbal shift change report given to Pastor Gala RN (oncoming nurse) by Derrick Sinclair RN  (offgoing nurse). Report included the following information SBAR, Kardex, Intake/Output, MAR and Recent Results.

## 2019-04-30 NOTE — PROGRESS NOTES
Patient refusing to wear SCDs and JEVON hose, even though recommended by MD after doppler r/t left calf pain. Patient also asking about stronger pain medication, has taken Norco with previous deliveries and had no reaction, history of hives when teen after knee surgery, RN pulled Norco, but after discussion of pain level 0 when in bed resting, she decided to wait until later when she needs it.

## 2019-05-01 PROCEDURE — 77030018846 HC SOL IRR STRL H20 ICUM -A

## 2019-05-01 PROCEDURE — 3E0R3GC INTRODUCTION OF OTHER THERAPEUTIC SUBSTANCE INTO SPINAL CANAL, PERCUTANEOUS APPROACH: ICD-10-PCS | Performed by: ANESTHESIOLOGY

## 2019-05-01 PROCEDURE — 65270000029 HC RM PRIVATE

## 2019-05-01 PROCEDURE — 74011250637 HC RX REV CODE- 250/637: Performed by: OBSTETRICS & GYNECOLOGY

## 2019-05-01 RX ADMIN — HYDROCODONE BITARTRATE AND ACETAMINOPHEN 1 TABLET: 5; 325 TABLET ORAL at 12:58

## 2019-05-01 RX ADMIN — Medication 1 TABLET: at 08:50

## 2019-05-01 RX ADMIN — HYDROCODONE BITARTRATE AND ACETAMINOPHEN 1 TABLET: 5; 325 TABLET ORAL at 02:01

## 2019-05-01 RX ADMIN — IBUPROFEN 800 MG: 800 TABLET ORAL at 05:41

## 2019-05-01 RX ADMIN — IBUPROFEN 800 MG: 800 TABLET ORAL at 23:02

## 2019-05-01 RX ADMIN — HYDROCODONE BITARTRATE AND ACETAMINOPHEN 1 TABLET: 5; 325 TABLET ORAL at 08:50

## 2019-05-01 RX ADMIN — IBUPROFEN 800 MG: 800 TABLET ORAL at 12:58

## 2019-05-01 RX ADMIN — HYDROCODONE BITARTRATE AND ACETAMINOPHEN 1 TABLET: 5; 325 TABLET ORAL at 05:06

## 2019-05-01 NOTE — PROGRESS NOTES
Post-Operative  Day 2    Roman Aguayo     Assessment:   Post-Op day 2 s/p PLTCS for uterine prolapse, doing well  Mild chronic anemia of pregnancy: Hgb stable, VS WNL, asymptomatic  L calf pain on POD 0: dopplers WNL  Male, declines circumcision      Plan:   1. Routine post-operative care    Information for the patient's :  Trent Zuleta Male [facility] [[id number]]   , Low Transverse   Patient doing well without significant complaint. Nausea and vomiting resolved, tolerating liquids, passing flatus, voiding and ambulating without difficulty. Vitals:  Visit Vitals  /56 (BP 1 Location: Left arm, BP Patient Position: At rest)   Pulse 61   Temp 98.5 °F (36.9 °C)   Resp 14   Ht 5' 7\" (1.702 m)   Wt 73.5 kg (162 lb)   SpO2 97%   Breastfeeding? Unknown   BMI 25.37 kg/m²     Temp (24hrs), Av.4 °F (36.9 °C), Min:98.1 °F (36.7 °C), Max:98.6 °F (37 °C)        Exam:        Patient without distress. Abdomen, bowel sounds present, soft, expected tenderness, fundus firm                Wound incision clean, dry and intact with steris in place               Lower extremities are negative for swelling, cords or tenderness.     Labs:   Lab Results   Component Value Date/Time    WBC 12.4 (H) 2019 02:36 AM    WBC 9.5 2019 02:23 AM    WBC 14.0 (H) 2016 02:56 AM    WBC 10.7 2016 08:30 AM    WBC 8.3 2015 11:24 AM    WBC 9.1 2013 01:11 AM    HGB 11.0 (L) 2019 02:36 AM    HGB 10.9 (L) 2019 02:23 AM    HGB 10.8 (L) 2016 02:56 AM    HGB 11.3 (L) 2016 08:30 AM    HGB 12.4 2015 11:24 AM    HGB 11.9 2013 01:11 AM    HCT 33.2 (L) 2019 02:36 AM    HCT 33.0 (L) 2019 02:23 AM    HCT 32.9 (L) 2016 02:56 AM    HCT 33.8 (L) 2016 08:30 AM    HCT 38.2 2015 11:24 AM    HCT 35.2 2013 01:11 AM    PLATELET 943  02:36 AM    PLATELET 082  02:23 AM    PLATELET 696  02:56 AM    PLATELET 194 02/27/2016 08:30 AM    PLATELET 389 69/33/3370 11:24 AM    PLATELET 551 59/66/0568 01:11 AM       No results found for this or any previous visit (from the past 24 hour(s)).

## 2019-05-01 NOTE — ANESTHESIA POSTPROCEDURE EVALUATION
Procedure(s):  SECTION. No value filed. Anesthesia Post Evaluation Patient location during evaluation: bedside Level of consciousness: awake Pain management: satisfactory to patient Airway patency: patent Anesthetic complications: no 
Cardiovascular status: acceptable Respiratory status: acceptable Hydration status: acceptable Vitals Value Taken Time /57 2019  5:22 AM  
Temp 36.6 °C (97.8 °F) 2019  5:03 AM  
Pulse 66 2019  5:22 AM  
Resp 18 2019  5:03 AM  
SpO2 100 % 2019  5:08 AM

## 2019-05-01 NOTE — LACTATION NOTE
This note was copied from a baby's chart. Mother resting in bed with head slightly elevated. She has a positional headache that feels worse when she is upright. She is trying to drink caffeine to see if it will help. [address] [facility] instructed mother to call when she breastfeeds again to see if she can help her position baby so that she is comfortable. Pt will successfully establish breastfeeding by feeding in response to early feeding cues   or wake every 3h, will obtain deep latch, and will keep log of feedings/output. Taught to BF at hunger cues and or q 2-3 hrs and to offer 10-20 drops of hand expressed colostrum at any non-feeds. Breast Assessment  Left Breast: Medium  Left Nipple: Everted, Intact  Right Breast: Medium  Right Nipple: Everted, Intact  Breast- Feeding Assessment  Attends Breast-Feeding Classes: No  Breast-Feeding Experience: Yes  Breast Trauma/Surgery: No  Type/Quality: Good(Mother states baby has been breastfeeding well. )  Lactation Consultant Visits  Breast-Feedings: (Mother is resting in bed w/head slightly elevated and has a positional Headache. She saw anesthesiologist re: H/A. Mother last breast fed baby at 0800 for 30 min. Her milk is in. Discussed positioning baby for feeding. Mom to call [address] [facility] for feeding assistance )

## 2019-05-01 NOTE — ROUTINE PROCESS
Bedside and Verbal shift change report given to Katherine Darnell RN (oncoming nurse) by Vishnu Hernandez RN (offgoing nurse). Report included the following information SBAR, Kardex and MAR.

## 2019-05-01 NOTE — LACTATION NOTE
Mother returned to room from having a blood patch. She has to lay flat post procedure. Mother states baby breast fed prior to having the procedure done and he fed well. Instructed mother to call [address] [facility] if she would like breastfeeding assistance.

## 2019-05-01 NOTE — PROGRESS NOTES
I was asked to see Ms Maddie Santoyo for evaluation and treatment of a PDPH. She is POD 2 s/p a repeat c section, performed under spinal anesthesia. Per pt and Dr Cordell Randhawa (who performed the spinal), the procedure was uneventful. Unfortunately, since , Ms Maddie Santoyo has had a headache that she describes as a throbbing headache that is exacerbated by standing or sitting up, alleviated by lying down.  7-8/10 while standing up. She also endorses photophobia, neck soreness, pressure in her head. She denies fever, chills, nuchal rigidty, lower back pain, LE symptoms. She has tried hydration, caffeine and norco, all of which have helped but have not helped enough to allow her to be able to care for herself and for her  and 1year old. Ms Maddie Santoyo spoke with Dr Chaz Valencia last night and was advised to wait til the afternoon to make sure the conservative measures would not suffice. I had a long coversation with her and her  about the R/B/A of an epidural blood patch. All questions were answered. She wishes to proceed with the blood patch. Procedure:  Pt was transported to our pre-op unit. Standard monitors placed, VSS. Pt to seated position, consent signed. Timeout at 1405    Under sterile conditions (hat, sterile gloves, mask), Kristin Gupta aspirated 20 cc of blood from left AC    Epidural:  Sterile gloves, mask, hat  Sterile prep with chlorhexidine, sterile drape  17 G tuohy to LANDON with air and saline  Upon LANDON, 20 Cc of pts blood injected. No pain. Pt tolerated the procedure well. Pt to supine position and instructed to remain supine x 1 hour. Transported back to her room.

## 2019-05-01 NOTE — PROGRESS NOTES
9:27 PM   During assessment, pt c/o headache that only happens when standing. Rates pain a 8/10. Vital signs have been normal. No epigastric pain per patient. Pt states that she finds it hard to focus when standing. She looked up a spinal headache and feels that her symptoms are very similar. Would like to speak with anesthesia regarding possible spinal headache. Paged Andrew Yi with Anesthesia to evaluate patient. Per Marcial Mireles, he will come see her. Will continue to monitor. 9:52 PM  Andrew Yi MD at bedside with patient. No new orders at this time. Pt advised to rest, hydrate PO and take pain medication as needed. Will continue to monitor headache.       7:22 AM  Bedside and Verbal shift change report given to Panfilo Ratliff RN (oncoming nurse) by Carmine Woodruff RN (offgoing nurse). Report given with SBAR, Kardex, Intake/Output and MAR.

## 2019-05-02 VITALS
SYSTOLIC BLOOD PRESSURE: 114 MMHG | WEIGHT: 162 LBS | HEIGHT: 67 IN | TEMPERATURE: 98.4 F | BODY MASS INDEX: 25.43 KG/M2 | DIASTOLIC BLOOD PRESSURE: 56 MMHG | OXYGEN SATURATION: 97 % | HEART RATE: 54 BPM | RESPIRATION RATE: 16 BRPM

## 2019-05-02 PROCEDURE — 74011250637 HC RX REV CODE- 250/637: Performed by: OBSTETRICS & GYNECOLOGY

## 2019-05-02 PROCEDURE — 77030036554

## 2019-05-02 RX ORDER — HYDROCODONE BITARTRATE AND ACETAMINOPHEN 5; 325 MG/1; MG/1
1 TABLET ORAL
Qty: 35 TAB | Refills: 0 | Status: SHIPPED | OUTPATIENT
Start: 2019-05-02 | End: 2019-05-09

## 2019-05-02 RX ORDER — IBUPROFEN 800 MG/1
800 TABLET ORAL EVERY 8 HOURS
Qty: 60 TAB | Refills: 1 | Status: SHIPPED | OUTPATIENT
Start: 2019-05-02 | End: 2021-03-25

## 2019-05-02 RX ADMIN — IBUPROFEN 800 MG: 800 TABLET ORAL at 07:58

## 2019-05-02 RX ADMIN — ACETAMINOPHEN 650 MG: 325 TABLET ORAL at 09:19

## 2019-05-02 NOTE — LACTATION NOTE
This note was copied from a baby's chart. Mother and baby are for discharge today. Mother states her milk is in and baby is latching on and breastfeeding well. Mother's headache has improved and she feels much better today. LC discussed the following:    Engorgement Care Guidelines:  Reviewed how milk is made and normal phases of milk production. Taught care of engorged breasts - frequent breastfeeding encouraged, cool packs and motrin as tolerated. Anticipatory guidance shared. Care for sore/tender nipples discussed:  ways to improve positioning and latch practiced and discussed, hand express colostrum after feedings and let air dry, light application of lanolin, hydrogel pads, seek comfortable laid back feeding position, start feedings on least sore side first.    Discussed eating a healthy diet. Instructed mother to eat a variety of foods in order to get a well balanced diet. She should consume an extra 500 calories per day (more than her non-pregnant requirement.) These extra calories will help provide energy needed for optimal breast milk production. Mother also encouraged to \"drink to thirst\" and it is recommended that she drink fluids such as water, fruit/vegetable juice. Nutritious snacks should be available so that she can eat throughout the day to help satisfy her hunger and maintain a good milk supply. Discussed pumping/storage and preparation of expressed breast milk. Pt will successfully establish breastfeeding by feeding in response to early feeding cues   or wake every 3h, will obtain deep latch, and will keep log of feedings/output. Taught to BF at hunger cues and or q 2-3 hrs and to offer 10-20 drops of hand expressed colostrum at any non-feeds.       Breast Assessment  Left Breast: Medium  Left Nipple: Everted, Intact  Right Breast: Medium  Right Nipple: Everted, Intact  Breast- Feeding Assessment  Attends Breast-Feeding Classes: No  Breast-Feeding Experience: Yes  Breast Trauma/Surgery: No  Type/Quality: Good  Lactation Consultant Visits  Breast-Feedings: (Mother states she feels much better today. Baby last  at 65 for 30 minutes. She and baby are for D/C. Mom's milk is in. Mother to call [facility] for breastfeeding assistance. )    Mother has LC#/breastfeeding support group info.

## 2019-05-02 NOTE — ROUTINE PROCESS
Reviewed discharge instructions with patient including medications, follow-up, and when to call MD. Provided time for questions, obtained signature, patient discharged to home.

## 2019-05-02 NOTE — ROUTINE PROCESS
Bedside and Verbal shift change report given to Janette Kim RN (oncoming nurse) by Rebecca Warren RN (offgoing nurse). Report given with SBAR, Kardex, Intake/Output and MAR.

## 2019-05-02 NOTE — DISCHARGE INSTRUCTIONS
Patient Education         Section: What to Expect at Home  Your Recovery    A  section, or , is surgery to deliver your baby through a cut, called an incision, that the doctor makes in your lower belly and uterus. You may have some pain in your lower belly and need pain medicine for 1 to 2 weeks. You can expect some vaginal bleeding for several weeks. You will probably need about 6 weeks to fully recover. It is important to take it easy while the incision is healing. Avoid heavy lifting, strenuous activities, or exercises that strain the belly muscles while you are recovering. Ask a family member or friend for help with housework, cooking, and shopping. This care sheet gives you a general idea about how long it will take for you to recover. But each person recovers at a different pace. Follow the steps below to get better as quickly as possible. How can you care for yourself at home? Activity    · Rest when you feel tired. Getting enough sleep will help you recover.     · Try to walk each day. Start by walking a little more than you did the day before. Bit by bit, increase the amount you walk. Walking boosts blood flow and helps prevent pneumonia, constipation, and blood clots.     · Avoid strenuous activities, such as bicycle riding, jogging, weightlifting, and aerobic exercise, for 6 weeks or until your doctor says it is okay.     · Until your doctor says it is okay, do not lift anything heavier than your baby.     · Do not do sit-ups or other exercises that strain the belly muscles for 6 weeks or until your doctor says it is okay.     · Hold a pillow over your incision when you cough or take deep breaths. This will support your belly and decrease your pain.     · You may shower as usual. Pat the incision dry when you are done.     · You will have some vaginal bleeding. Wear sanitary pads.  Do not douche or use tampons until your doctor says it is okay.     · Ask your doctor when you can drive again.     · You will probably need to take at least 6 weeks off work. It depends on the type of work you do and how you feel.     · Ask your doctor when it is okay for you to have sex. Diet    · You can eat your normal diet. If your stomach is upset, try bland, low-fat foods like plain rice, broiled chicken, toast, and yogurt.     · Drink plenty of fluids (unless your doctor tells you not to).     · You may notice that your bowel movements are not regular right after your surgery. This is common. Try to avoid constipation and straining with bowel movements. You may want to take a fiber supplement every day. If you have not had a bowel movement after a couple of days, ask your doctor about taking a mild laxative.     · If you are breastfeeding, limit alcohol. Alcohol can cause a lack of energy and other health problems for the baby when a breastfeeding woman drinks heavily. It can also get in the way of a mom's ability to feed her baby or to care for the child in other ways. There isn't a lot of research about exactly how much alcohol can harm a baby. Having no alcohol is the safest choice for your baby. If you choose to have a drink now and then, have only one drink, and limit the number of occasions that you have a drink. Wait to breastfeed at least 2 hours after you have a drink to reduce the amount of alcohol the baby may get in the milk. Medicines    · Your doctor will tell you if and when you can restart your medicines. He or she will also give you instructions about taking any new medicines.     · If you take blood thinners, such as warfarin (Coumadin), clopidogrel (Plavix), or aspirin, be sure to talk to your doctor. He or she will tell you if and when to start taking those medicines again. Make sure that you understand exactly what your doctor wants you to do.     · Take pain medicines exactly as directed. ? If the doctor gave you a prescription medicine for pain, take it as prescribed. ?  If you are not taking a prescription pain medicine, ask your doctor if you can take an over-the-counter medicine.     · If you think your pain medicine is making you sick to your stomach:  ? Take your medicine after meals (unless your doctor has told you not to). ? Ask your doctor for a different pain medicine.     · If your doctor prescribed antibiotics, take them as directed. Do not stop taking them just because you feel better. You need to take the full course of antibiotics. Incision care    · If you have strips of tape on the incision, leave the tape on for a week or until it falls off.     · Wash the area daily with warm, soapy water, and pat it dry. Don't use hydrogen peroxide or alcohol, which can slow healing. You may cover the area with a gauze bandage if it weeps or rubs against clothing. Change the bandage every day.     · Keep the area clean and dry. Other instructions    · If you breastfeed your baby, you may be more comfortable while you are healing if you place the baby so that he or she is not resting on your belly. Try tucking your baby under your arm, with his or her body along the side you will be feeding on. Support your baby's upper body with your arm. With that hand you can control your baby's head to bring his or her mouth to your breast. This is sometimes called the football hold. Follow-up care is a key part of your treatment and safety. Be sure to make and go to all appointments, and call your doctor if you are having problems. It's also a good idea to know your test results and keep a list of the medicines you take. When should you call for help? Call 911 anytime you think you may need emergency care.  For example, call if:    · You passed out (lost consciousness).     · You have chest pain, are short of breath, or cough up blood.    Call your doctor now or seek immediate medical care if:    · You have pain that does not get better after you take pain medicine.     · You have severe vaginal bleeding.     · You are dizzy or lightheaded, or you feel like you may faint.     · You have new or worse pain in your belly or pelvis.     · You have loose stitches, or your incision comes open.     · You have symptoms of infection, such as:  ? Increased pain, swelling, warmth, or redness. ? Red streaks leading from the incision. ? Pus draining from the incision. ? A fever.     · You have symptoms of a blood clot in your leg (called a deep vein thrombosis), such as:  ? Pain in your calf, back of the knee, thigh, or groin. ? Redness and swelling in your leg or groin.    Watch closely for changes in your health, and be sure to contact your doctor if:    · You do not get better as expected. Where can you learn more? Go to http://shona-esa.info/. Enter M806 in the search box to learn more about \" Section: What to Expect at Home. \"  Current as of: 2018  Content Version: 11.9  © 4316-3574 [employer]. Care instructions adapted under license by [employer] (which disclaims liability or warranty for this information). If you have questions about a medical condition or this instruction, always ask your healthcare professional. [address] any warranty or liability for your use of this information.

## 2019-05-02 NOTE — PROGRESS NOTES
Post-Operative  Day 3    Bradley Keita       Assessment: Post-Op day 3 for uterine prolapse, doing well  Mild chronic anemia of pregnancy: Hgb stable, VS WNL, asymptomatic  L calf pain on POD 0: dopplers WNL  Spinal Headache: Blood patch performed yesterday, headache improved   Male, declines circumcision      Plan:   1. Discharge home today  2. Follow up in office in 6 weeks with Opal Pichardo MD  3. Post partum activity/wound care advised, diet as tolerated  4. Discharge Medications: ibuprofen, percocet and medications prior to admission      Information for the patient's :  Jon Orozco Male Tory Escort [[id number]]   , Low Transverse   Patient doing well without significant complaint. Tolerating diet, passing flatus, voiding and ambulating without difficulty    Vitals:  Visit Vitals  /56   Pulse (!) 54   Temp 98.4 °F (36.9 °C)   Resp 16   Ht 5' 7\" (1.702 m)   Wt 73.5 kg (162 lb)   SpO2 97%   Breastfeeding? Unknown   BMI 25.37 kg/m²     Temp (24hrs), Av.4 °F (36.9 °C), Min:98.3 °F (36.8 °C), Max:98.4 °F (36.9 °C)        Exam:        Patient without distress. Abdomen, bowel sounds present, soft, expected tenderness, fundus firm                Wound incision clean, dry and intact               Lower extremities are negative for swelling, cords or tenderness.     Labs:   Lab Results   Component Value Date/Time    WBC 12.4 (H) 2019 02:36 AM    WBC 9.5 2019 02:23 AM    WBC 14.0 (H) 2016 02:56 AM    WBC 10.7 2016 08:30 AM    WBC 8.3 2015 11:24 AM    WBC 9.1 2013 01:11 AM    HGB 11.0 (L) 2019 02:36 AM    HGB 10.9 (L) 2019 02:23 AM    HGB 10.8 (L) 2016 02:56 AM    HGB 11.3 (L) 2016 08:30 AM    HGB 12.4 2015 11:24 AM    HGB 11.9 2013 01:11 AM    HCT 33.2 (L) 2019 02:36 AM    HCT 33.0 (L) 2019 02:23 AM    HCT 32.9 (L) 2016 02:56 AM    HCT 33.8 (L) 2016 08:30 AM    HCT 38.2 08/04/2015 11:24 AM    HCT 35.2 04/01/2013 01:11 AM    PLATELET 610 11/82/2175 02:36 AM    PLATELET 960 58/48/5142 02:23 AM    PLATELET 080 42/31/3706 02:56 AM    PLATELET 210 60/30/1206 08:30 AM    PLATELET 318 05/56/4645 11:24 AM    PLATELET 206 76/36/7380 01:11 AM       No results found for this or any previous visit (from the past 24 hour(s)).

## 2019-05-02 NOTE — DISCHARGE SUMMARY
Obstetrical Discharge Summary     Name: Anna Barlow MRN: [id number]  SSN: [id number]    YOB: 1985  Age: 35 y.o. Sex: female      Admit Date: 2019    Discharge Date: 2019     Admitting Physician: Mercedez Smith MD     Attending Physician:  Alexandru Jacobson MD     Admission Diagnoses: Pregnancy [Z34.90]    Discharge Diagnoses:   Information for the patient's :  Nida Bonilla Male Marian Otto [[id number]]   Delivery of a 4.025 kg male infant via , Low Transverse on 2019 at 3:52 AM  by . Apgars were 9 and 9. Additional Diagnoses:   Hospital Problems  Date Reviewed: 10/19/2015          Codes Class Noted POA    Pregnancy ICD-10-CM: Z34.90  ICD-9-CM: V22.2  2019 Unknown             Lab Results   Component Value Date/Time    Rubella, External Immune 2018    GrBStrep, External Negative 04/10/2019       Hospital Course: Normal hospital course following the delivery. Disposition at Discharge: Home or self care    Discharged Condition: Stable    Patient Instructions:   Current Discharge Medication List      START taking these medications    Details   HYDROcodone-acetaminophen (NORCO) 5-325 mg per tablet Take 1 Tab by mouth every six (6) hours as needed for Pain for up to 7 days. Max Daily Amount: 4 Tabs. Qty: 35 Tab, Refills: 0    Associated Diagnoses: Postoperative pain      ibuprofen (MOTRIN) 800 mg tablet Take 1 Tab by mouth every eight (8) hours. Qty: 60 Tab, Refills: 1    Associated Diagnoses: Postoperative pain         CONTINUE these medications which have NOT CHANGED    Details   LACTOBACILLUS ACIDOPHILUS (PROBIOTIC PO) Take  by mouth.      prenatal vit-iron fumarate-fa (PRENATAL VITAMIN WITH MINERALS) 28-0.8 mg tab Take 1 tablet by mouth daily,         STOP taking these medications       loratadine (CLARITIN) 10 mg tablet Comments:   Reason for Stopping:               Reference my discharge instructions.     Follow-up Appointments   Procedures    FOLLOW UP VISIT Appointment in: 6 Weeks     Standing Status:   Standing     Number of Occurrences:   1     Order Specific Question:   Appointment in     Answer:   6 Weeks        Signed By:  Jessica Figueroa MD     May 2, 2019

## 2021-03-25 ENCOUNTER — HOSPITAL ENCOUNTER (OUTPATIENT)
Dept: PREADMISSION TESTING | Age: 36
Discharge: HOME | End: 2021-03-25
Attending: SPECIALIST

## 2021-03-25 VITALS
HEART RATE: 98 BPM | DIASTOLIC BLOOD PRESSURE: 76 MMHG | HEIGHT: 67 IN | TEMPERATURE: 98.3 F | WEIGHT: 136.47 LBS | RESPIRATION RATE: 16 BRPM | BODY MASS INDEX: 21.42 KG/M2 | SYSTOLIC BLOOD PRESSURE: 118 MMHG | OXYGEN SATURATION: 98 %

## 2021-03-25 RX ORDER — LORATADINE 10 MG/1
10 TABLET ORAL DAILY
COMMUNITY
End: 2021-07-07 | Stop reason: CLARIF

## 2021-03-25 NOTE — PERIOP NOTES
[address] [address] [facility]   MAIN OR                                  [phone]   MAIN PRE OP                          [phone]                                                                                AMBULATORY PRE OP          [phone]  PRE-ADMISSION TESTING    [phone]   Surgery Date:  Thursday 4/1/21       Is surgery arrival time given by surgeon? YES  NO  If NO, [facility] staff will call you between 3 and 7pm the day before your surgery with your arrival time. (If your surgery is on a Monday, we will call you the Friday before.)    Call [phone] after 7pm Monday-Friday if you did not receive this call. INSTRUCTIONS BEFORE YOUR SURGERY   When You  Arrive Arrive at the [address] [facility] on the day of your surgery  Have your insurance card, photo ID, and any copayment (if needed)   Food   and   Drink NO food or drink after midnight the night before surgery    This means NO water, gum, mints, coffee, juice, etc.  No alcohol (beer, wine, liquor) 24 hours before and after surgery   Medications to   TAKE   Morning of Surgery MEDICATIONS TO TAKE THE MORNING OF SURGERY WITH A SIP OF WATER:       Medications  To  STOP      7 days before surgery  Non-Steroidal anti-inflammatory Drugs (NSAID's): for example, Ibuprofen (Advil, Motrin), Naproxen (Aleve)   Aspirin, if taking for pain    Herbal supplements, vitamins, and fish oil   Other:  (Pain medications not listed above, including Tylenol may be taken)   Blood  Thinners  If you take  Aspirin, Plavix, Coumadin, or any blood-thinning or anti-blood clot medicine, talk to the doctor who prescribed the medications for pre-operative instructions.    Bathing Clothing  Jewelry  Valuables      If you shower the morning of surgery, please do not apply anything to your skin (lotions, powders, deodorant, or makeup, especially mascara)   Follow Chlorhexidine Care Fusion body wash instructions provided to you during PAT appointment. Begin 3 days prior to surgery.  Do not shave or trim anywhere 24 hours before surgery   Wear your hair loose or down; no pony-tails, buns, or metal hair clips   Wear loose, comfortable, clean clothes   Wear glasses instead of contacts   Leave money, valuables, and jewelry, including body piercings, at home   Going Home - or Spending the Night  SAME-DAY SURGERY: You must have a responsible adult drive you home and stay with you 24 hours after surgery   ADMITS: If your doctor is keeping you in the hospital after surgery, leave personal belongings/luggage in your car until you have a hospital room number. Hospital discharge time is 12 noon  Drivers must be here before 12 noon unless you are told differently   Special Instructions It is now mandated that all surgical patients be tested for COVID-19 prior to surgery. Testing has to be exactly 4 days prior to surgery. Your COVID test date is Bl[place] 3/28/21 between 8:00 am and 11:00 am.       COVID testing will be performed curbside at the [facility] [address] [facility]. There will be signs leading you to the testing site. You will need to bring a photo ID with you to be swabbed. Patients are advised to self-quarantine at home after testing and prior to your surgery date. You will be notified if your results are positive.     What to watch for:   Coronavirus (COVID-19) affects different people in different ways   It also appears with a wide range of symptoms from mild to severe   Signs usually appear 2-14 days after exposure     If you develop any of the following, notify your doctor immediately:  o Fever  o Chills, with or without a shiver  o Muscle pain  o Headache  o Sore throat  o Dry cough  o New loss of taste or smell  o Tiredness      If you develop any of the following, call 911:  o Shortness of breath  o Difficulty breathing  o Chest pain  o New confusion  o Blueness of fingers and/or lips     Follow all instructions so your surgery wont be cancelled. Please, be on time. If a situation occurs and you are delayed the day of surgery, call  [phone]. If your physical condition changes (like a fever, cold, flu, etc.) call your surgeon. Home medication(s) reviewed and verified via     LIST   VERBAL   during PAT appointment. The patient was contacted by     IN-PERSON  The patient verbalizes understanding of all instructions and     DOES NOT   need reinforcement.

## 2021-03-28 ENCOUNTER — HOSPITAL ENCOUNTER (OUTPATIENT)
Dept: PREADMISSION TESTING | Age: 36
Discharge: HOME | End: 2021-03-28
Payer: COMMERCIAL

## 2021-03-28 PROCEDURE — U0003 INFECTIOUS AGENT DETECTION BY NUCLEIC ACID (DNA OR RNA); SEVERE ACUTE RESPIRATORY SYNDROME CORONAVIRUS 2 (SARS-COV-2) (CORONAVIRUS DISEASE [COVID-19]), AMPLIFIED PROBE TECHNIQUE, MAKING USE OF HIGH THROUGHPUT TECHNOLOGIES AS DESCRIBED BY CMS-2020-01-R: HCPCS

## 2021-03-29 LAB — SARS-COV-2, COV2NT: NOT DETECTED

## 2021-03-29 NOTE — H&P
History and Physical    Patient: Екатерина Xiong MRN: [id number]  SSN: [id number]    YOB: 1985  Age: 28 y.o. Sex: female      Subjective:      Екатерина Xiong is a 28 y.o. female who has had many years of nasal congestion but the last 6 months the issues have become worse. She notes her Left side is worse than the right. Denies headaches or facial pain. Past Medical History:   Diagnosis Date    PONV (postoperative nausea and vomiting)     Tachycardia     Unspecified breast disorder 2015    lumpectomy  right breast cyst    Unspecified breast disorder     axillary node dissection    Unspecified breast disorder     left breast biopsy    Unspecified breast disorder     BRCA test negative    Uterine prolapse affecting pregnancy in second trimester, antepartum 9/3/2015    1 degree cystocele and descent of cervix to introitus with prolonged standing and straining. Fitted with #4 Ring pessary with support     Past Surgical History:   Procedure Laterality Date    HX ACL RECONSTRUCTION Right     HX  SECTION      x 2    HX TONSILLECTOMY      SC BREAST SURGERY PROCEDURE UNLISTED        Family History   Problem Relation Age of Onset   [address] [place] [address] Breast Cancer Mother 45         age 36    Breast Cancer Maternal Grandmother 36        reoccured 2-3 times    Other Father 36        parkinsons    Other Brother         bipolar    Hypertension Maternal Grandfather 46        MI    Diabetes Paternal Grandmother 66        unsure of diabete type; liver disease     Social History     Tobacco Use    Smoking status: Never Smoker    Smokeless tobacco: Never Used   Substance Use Topics    Alcohol use: No    Drug use: Never      Prior to Admission medications    Medication Sig Start Date End Date Taking? Authorizing Provider   loratadine (Claritin) 10 mg tablet Take 10 mg by mouth daily. Yes Provider, Historical   LACTOBACILLUS ACIDOPHILUS (PROBIOTIC PO) Take  by mouth.    Yes Provider, Historical   prenatal vit-iron fumarate-fa (PRENATAL VITAMIN WITH MINERALS) 28-0.8 mg tab Take 1 tablet by mouth daily,   Yes Provider, Historical        Allergies   Allergen Reactions    Shellfish Derived Anaphylaxis    Soy Nausea and Vomiting       Review of Systems:  Constitutional: Negative for chills and fever  HENT: Congestion  Eyes: negative for blurred vision and double vision  Respiratory: Negative for cough, shortness of breath and wheezing  Mouth: Negative for loose, broken or chipped teeth. Cardiovascular: Negative for chest pain and palpitations  Gastrointestinal: Negative for abdominal pain, constipation, diarrhea and nausea  Genitourinary: Negative for dysuria and hematuria  Musculoskeletal: Negative for joint pain  Skin: Negative for rash, open wounds. Negative for bruises easily  Neurological: Negative for dizziness, tremors and headaches  Psychiatric: Negative for depression. The patient is not nervous/anxious. Objective:     Vitals:    03/25/21 1418   BP: 118/76   Pulse: 98   Resp: 16   Temp: 98.3 °F (36.8 °C)   SpO2: 98%   Weight: 61.9 kg (136 lb 7.4 oz)   Height: 5' 7\" (1.702 m)        Physical Exam:  Constitutional:  Appears well,  No Acute Distress, Vitals noted  Psychiatric:   Affect normal, Alert and Oriented to person/place/time    Eyes:   Pupils equally round and reactive, EOMI, conjunctiva clear, eyelids normal  ENT:   External ears and nose normal, teeth normal, gums normal, TMs and Orophyarynx normal  Neck:   General inspection and Thyroid normal.  No abnormal cervical or supraclavicular nodes    Lungs:   Clear to auscultation, good respiratory effort  Heart: Ausculation normal.  Regular rhythm. No cardiac murmurs.   No carotid bruits or palpable thrills  Chest wall normal  Musculoskeletal: Normal gait  Extremities:   Without edema, good peripheral pulses  Skin:   Warm to palpation, without rashes, bruising, or suspicious lesions     No results found for this or any previous visit (from the past 72 hour(s)).       Assessment:     Nasal congestion    Plan:     Septoplasty    Signed By: Chicho Allen NP     March 28, 2021

## 2021-03-31 ENCOUNTER — ANESTHESIA EVENT (OUTPATIENT)
Dept: SURGERY | Age: 36
End: 2021-03-31
Payer: COMMERCIAL

## 2021-04-01 ENCOUNTER — HOSPITAL ENCOUNTER (OUTPATIENT)
Age: 36
Setting detail: OUTPATIENT SURGERY
Discharge: HOME | End: 2021-04-01
Attending: SPECIALIST | Admitting: SPECIALIST
Payer: COMMERCIAL

## 2021-04-01 ENCOUNTER — ANESTHESIA (OUTPATIENT)
Dept: SURGERY | Age: 36
End: 2021-04-01
Payer: COMMERCIAL

## 2021-04-01 VITALS
WEIGHT: 131.84 LBS | TEMPERATURE: 98.2 F | SYSTOLIC BLOOD PRESSURE: 107 MMHG | BODY MASS INDEX: 20.69 KG/M2 | HEIGHT: 67 IN | HEART RATE: 80 BPM | RESPIRATION RATE: 14 BRPM | DIASTOLIC BLOOD PRESSURE: 66 MMHG | OXYGEN SATURATION: 95 %

## 2021-04-01 PROBLEM — J34.2 DEVIATED SEPTUM: Status: ACTIVE | Noted: 2021-04-01

## 2021-04-01 LAB — HCG UR QL: NEGATIVE

## 2021-04-01 PROCEDURE — 77030002888 HC SUT CHRMC J&J -A: Performed by: SPECIALIST

## 2021-04-01 PROCEDURE — 74011250636 HC RX REV CODE- 250/636: Performed by: STUDENT IN AN ORGANIZED HEALTH CARE EDUCATION/TRAINING PROGRAM

## 2021-04-01 PROCEDURE — 77030026438 HC STYL ET INTUB CARD -A: Performed by: NURSE ANESTHETIST, CERTIFIED REGISTERED

## 2021-04-01 PROCEDURE — 76210000034 HC AMBSU PH I REC 0.5 TO 1 HR: Performed by: SPECIALIST

## 2021-04-01 PROCEDURE — 81025 URINE PREGNANCY TEST: CPT

## 2021-04-01 PROCEDURE — 2709999900 HC NON-CHARGEABLE SUPPLY: Performed by: SPECIALIST

## 2021-04-01 PROCEDURE — 77030006907 HC BLD SNUS SHV MEDT -C: Performed by: SPECIALIST

## 2021-04-01 PROCEDURE — 74011250636 HC RX REV CODE- 250/636: Performed by: NURSE ANESTHETIST, CERTIFIED REGISTERED

## 2021-04-01 PROCEDURE — 77030008684 HC TU ET CUF COVD -B: Performed by: NURSE ANESTHETIST, CERTIFIED REGISTERED

## 2021-04-01 PROCEDURE — 74011000250 HC RX REV CODE- 250: Performed by: NURSE ANESTHETIST, CERTIFIED REGISTERED

## 2021-04-01 PROCEDURE — 77030021668 HC NEB PREFIL KT VYRM -A

## 2021-04-01 PROCEDURE — 74011250637 HC RX REV CODE- 250/637: Performed by: SPECIALIST

## 2021-04-01 PROCEDURE — 76060000063 HC AMB SURG ANES 1.5 TO 2 HR: Performed by: SPECIALIST

## 2021-04-01 PROCEDURE — 77030040922 HC BLNKT HYPOTHRM STRY -A

## 2021-04-01 PROCEDURE — 77030040361 HC SLV COMPR DVT MDII -B

## 2021-04-01 PROCEDURE — 76210000046 HC AMBSU PH II REC FIRST 0.5 HR: Performed by: SPECIALIST

## 2021-04-01 PROCEDURE — 77030040356 HC CORD BPLR FRCP COVD -A: Performed by: SPECIALIST

## 2021-04-01 PROCEDURE — 77030011645 HC PK NSL DOYLE MEDT -B: Performed by: SPECIALIST

## 2021-04-01 PROCEDURE — 74011000250 HC RX REV CODE- 250: Performed by: SPECIALIST

## 2021-04-01 PROCEDURE — 77030013079 HC BLNKT BAIR HGGR 3M -A: Performed by: NURSE ANESTHETIST, CERTIFIED REGISTERED

## 2021-04-01 PROCEDURE — 76030000003 HC AMB SURG OR TIME 1.5 TO 2: Performed by: SPECIALIST

## 2021-04-01 PROCEDURE — 77030002996 HC SUT SLK J&J -A: Performed by: SPECIALIST

## 2021-04-01 RX ORDER — SODIUM CHLORIDE, SODIUM LACTATE, POTASSIUM CHLORIDE, CALCIUM CHLORIDE 600; 310; 30; 20 MG/100ML; MG/100ML; MG/100ML; MG/100ML
125 INJECTION, SOLUTION INTRAVENOUS CONTINUOUS
Status: DISCONTINUED | OUTPATIENT
Start: 2021-04-01 | End: 2021-04-01 | Stop reason: HOSPADM

## 2021-04-01 RX ORDER — LIDOCAINE HYDROCHLORIDE 20 MG/ML
INJECTION, SOLUTION EPIDURAL; INFILTRATION; INTRACAUDAL; PERINEURAL AS NEEDED
Status: DISCONTINUED | OUTPATIENT
Start: 2021-04-01 | End: 2021-04-01 | Stop reason: HOSPADM

## 2021-04-01 RX ORDER — HYDROMORPHONE HYDROCHLORIDE 1 MG/ML
.5-1 INJECTION, SOLUTION INTRAMUSCULAR; INTRAVENOUS; SUBCUTANEOUS
Status: DISCONTINUED | OUTPATIENT
Start: 2021-04-01 | End: 2021-04-01 | Stop reason: HOSPADM

## 2021-04-01 RX ORDER — MIDAZOLAM HYDROCHLORIDE 1 MG/ML
INJECTION, SOLUTION INTRAMUSCULAR; INTRAVENOUS AS NEEDED
Status: DISCONTINUED | OUTPATIENT
Start: 2021-04-01 | End: 2021-04-01 | Stop reason: HOSPADM

## 2021-04-01 RX ORDER — DEXAMETHASONE SODIUM PHOSPHATE 4 MG/ML
INJECTION, SOLUTION INTRA-ARTICULAR; INTRALESIONAL; INTRAMUSCULAR; INTRAVENOUS; SOFT TISSUE AS NEEDED
Status: DISCONTINUED | OUTPATIENT
Start: 2021-04-01 | End: 2021-04-01 | Stop reason: HOSPADM

## 2021-04-01 RX ORDER — ONDANSETRON 2 MG/ML
INJECTION INTRAMUSCULAR; INTRAVENOUS AS NEEDED
Status: DISCONTINUED | OUTPATIENT
Start: 2021-04-01 | End: 2021-04-01 | Stop reason: HOSPADM

## 2021-04-01 RX ORDER — ROCURONIUM BROMIDE 10 MG/ML
INJECTION, SOLUTION INTRAVENOUS AS NEEDED
Status: DISCONTINUED | OUTPATIENT
Start: 2021-04-01 | End: 2021-04-01 | Stop reason: HOSPADM

## 2021-04-01 RX ORDER — PROPOFOL 10 MG/ML
INJECTION, EMULSION INTRAVENOUS
Status: DISCONTINUED | OUTPATIENT
Start: 2021-04-01 | End: 2021-04-01 | Stop reason: HOSPADM

## 2021-04-01 RX ORDER — LIDOCAINE HYDROCHLORIDE AND EPINEPHRINE 10; 10 MG/ML; UG/ML
INJECTION, SOLUTION INFILTRATION; PERINEURAL AS NEEDED
Status: DISCONTINUED | OUTPATIENT
Start: 2021-04-01 | End: 2021-04-01 | Stop reason: HOSPADM

## 2021-04-01 RX ORDER — FENTANYL CITRATE 50 UG/ML
INJECTION, SOLUTION INTRAMUSCULAR; INTRAVENOUS AS NEEDED
Status: DISCONTINUED | OUTPATIENT
Start: 2021-04-01 | End: 2021-04-01 | Stop reason: HOSPADM

## 2021-04-01 RX ORDER — SUCCINYLCHOLINE CHLORIDE 20 MG/ML
INJECTION INTRAMUSCULAR; INTRAVENOUS AS NEEDED
Status: DISCONTINUED | OUTPATIENT
Start: 2021-04-01 | End: 2021-04-01 | Stop reason: HOSPADM

## 2021-04-01 RX ORDER — PROPOFOL 10 MG/ML
INJECTION, EMULSION INTRAVENOUS AS NEEDED
Status: DISCONTINUED | OUTPATIENT
Start: 2021-04-01 | End: 2021-04-01 | Stop reason: HOSPADM

## 2021-04-01 RX ORDER — OXYMETAZOLINE HCL 0.05 %
SPRAY, NON-AEROSOL (ML) NASAL AS NEEDED
Status: DISCONTINUED | OUTPATIENT
Start: 2021-04-01 | End: 2021-04-01 | Stop reason: HOSPADM

## 2021-04-01 RX ORDER — ONDANSETRON 2 MG/ML
4 INJECTION INTRAMUSCULAR; INTRAVENOUS AS NEEDED
Status: DISCONTINUED | OUTPATIENT
Start: 2021-04-01 | End: 2021-04-01 | Stop reason: HOSPADM

## 2021-04-01 RX ORDER — LIDOCAINE HYDROCHLORIDE 10 MG/ML
0.1 INJECTION, SOLUTION EPIDURAL; INFILTRATION; INTRACAUDAL; PERINEURAL AS NEEDED
Status: DISCONTINUED | OUTPATIENT
Start: 2021-04-01 | End: 2021-04-01 | Stop reason: HOSPADM

## 2021-04-01 RX ADMIN — ONDANSETRON HYDROCHLORIDE 4 MG: 2 SOLUTION INTRAMUSCULAR; INTRAVENOUS at 07:57

## 2021-04-01 RX ADMIN — LIDOCAINE HYDROCHLORIDE 40 MG: 20 INJECTION, SOLUTION INTRAVENOUS at 07:35

## 2021-04-01 RX ADMIN — HYDROMORPHONE HYDROCHLORIDE 0.5 MG: 1 INJECTION, SOLUTION INTRAMUSCULAR; INTRAVENOUS; SUBCUTANEOUS at 09:58

## 2021-04-01 RX ADMIN — MIDAZOLAM HYDROCHLORIDE 2 MG: 2 INJECTION, SOLUTION INTRAMUSCULAR; INTRAVENOUS at 07:28

## 2021-04-01 RX ADMIN — HYDROMORPHONE HYDROCHLORIDE 0.5 MG: 1 INJECTION, SOLUTION INTRAMUSCULAR; INTRAVENOUS; SUBCUTANEOUS at 10:11

## 2021-04-01 RX ADMIN — SODIUM CHLORIDE, POTASSIUM CHLORIDE, SODIUM LACTATE AND CALCIUM CHLORIDE: 600; 310; 30; 20 INJECTION, SOLUTION INTRAVENOUS at 07:20

## 2021-04-01 RX ADMIN — SUCCINYLCHOLINE CHLORIDE 100 MG: 20 INJECTION, SOLUTION INTRAMUSCULAR; INTRAVENOUS at 07:38

## 2021-04-01 RX ADMIN — ROCURONIUM BROMIDE 5 MG: 10 INJECTION INTRAVENOUS at 07:37

## 2021-04-01 RX ADMIN — DEXAMETHASONE SODIUM PHOSPHATE 8 MG: 4 INJECTION, SOLUTION INTRAMUSCULAR; INTRAVENOUS at 07:47

## 2021-04-01 RX ADMIN — PROPOFOL 50 MG: 10 INJECTION, EMULSION INTRAVENOUS at 07:43

## 2021-04-01 RX ADMIN — FENTANYL CITRATE 50 MCG: 0.05 INJECTION, SOLUTION INTRAMUSCULAR; INTRAVENOUS at 07:31

## 2021-04-01 RX ADMIN — PROPOFOL 200 MCG/KG/MIN: 10 INJECTION, EMULSION INTRAVENOUS at 07:37

## 2021-04-01 RX ADMIN — PROPOFOL 150 MG: 10 INJECTION, EMULSION INTRAVENOUS at 07:37

## 2021-04-01 NOTE — ANESTHESIA POSTPROCEDURE EVALUATION
Procedure(s):  SEPTOPLASTY AND BILATERAL INFERIOR TURBINATE REDUCTION (GENERAL ET). total IV anesthesia    Anesthesia Post Evaluation      Multimodal analgesia: multimodal analgesia used between 6 hours prior to anesthesia start to PACU discharge  Patient location during evaluation: PACU  Patient participation: complete - patient participated  Level of consciousness: awake and alert  Pain management: adequate  Airway patency: patent  Anesthetic complications: no  Cardiovascular status: acceptable  Respiratory status: acceptable  Hydration status: acceptable  Post anesthesia nausea and vomiting:  none      INITIAL Post-op Vital signs:   Vitals Value Taken Time   /58 04/01/21 1015   Temp 37 °C (98.6 °F) 04/01/21 0929   Pulse 68 04/01/21 1018   Resp 12 04/01/21 1018   SpO2 93 % 04/01/21 1018   Vitals shown include unvalidated device data.

## 2021-04-01 NOTE — ANESTHESIA PREPROCEDURE EVALUATION
Relevant Problems   No relevant active problems       Anesthetic History     PONV          Review of Systems / Medical History  Patient summary reviewed, nursing notes reviewed and pertinent labs reviewed    Pulmonary            Asthma : well controlled  Pertinent negatives: No recent URI     Neuro/Psych   Within defined limits           Cardiovascular            Dysrhythmias : sinus tachycardia      Exercise tolerance: >4 METS     GI/Hepatic/Renal  Within defined limits           Pertinent negatives: No GERD   Endo/Other  Within defined limits           Other Findings              Physical Exam    Airway  Mallampati: II  TM Distance: 4 - 6 cm  Neck ROM: normal range of motion   Mouth opening: Normal     Cardiovascular    Rhythm: regular  Rate: normal         Dental    Dentition: Lower dentition intact and Upper dentition intact     Pulmonary  Breath sounds clear to auscultation            Pertinent negatives:  No wheezes   Abdominal         Other Findings            Anesthetic Plan    ASA: 2  Anesthesia type: total IV anesthesia          Induction: Intravenous  Anesthetic plan and risks discussed with: Patient

## 2021-04-01 NOTE — OP NOTES
OPERATIVE REPORT      NAME: Dinorah Bedoya  MRN: [id number]  DATE: 4/1/2021        PREOPERATIVE DIAGNOSES    1. Nasal septal deviation. 2. Bilateral inferior turbinate hypertrophy. POSTOPERATIVE DIAGNOSES    1. Nasal septal deviation. 2. Bilateral inferior turbinate hypertrophy. PROCEDURES PERFORMED    1. Nasal septoplasty. 2. Bilateral inferior turbinate submucosal resection. SURGEON: Rosa Stock MD    ASSISTANT: None. ANESTHESIA: General endotracheal.     ESTIMATED BLOOD LOSS: 15 cc. SPECIMENS REMOVED: Nasal septal cartilage and bone, bilateral  inferior turbinate submucosal tissue. No specimens sent to pathology. DRAINS: None. IMPLANTS: Bilateral Wright stents (to be removed in office in 3-4 days). COMPLICATIONS: None. INDICATION FOR SURGERY: Chronic nasal congestion refractory to medical management. Following discussion regarding the management options, a decision was made to proceed with nasal septoplasty and inferior turbinate submucosal resection with out fracture. OPERATIVE FINDINGS: Moderate to severe inferior deviation to the left side as well as severe bony spur to the left side posteriorly. Moderate inferior turbinate hypertrophy, right worse than left. DESCRIPTION OF PROCEDURE: The patient was met in the  preoperative area where the procedure was discussed in detail. We reviewed the procedure in some detail. Surgical and postoperative risks and complications were reviewed and an operative permit was obtained. The patient was then transferred to the operating room, where she was placed in a supine position on the  operating table. General anesthesia was commenced without difficulty, and the patient was orotracheally intubated. The table was rotated 90  degrees. The patient was positioned in the standard fashion for closed endonasal surgery. A surgical drape was applied. A multi-disciplinary surgical time-out was then performed.      The nasal cavities were inspected and the anterior septum was  injected with 1% lidocaine with epinephrine. Pledgets moistened with  0.05% oxymetazoline were placed for topical decongestion and  removed after about 3 minutes. An incision was then made in the left  anterior septum and a submucosal flap was elevated from anterior to  posterior. The bony cartilaginous junction was divided. Using a fairly conservative approach, any obstructing cartilage and bone was resected or mobilized medially,  taking care to leave an adequate L-shaped dorsal strut comprised of the quadrangular cartilage to provide support to the nasal tip. Most of the quadrangular cartilage was preserved except for an inferior portion resting on the maxillary crest. A portion of the maxillary crest was removed with a 3 mm osteotome. A small vessel was cauterized with bipolars. At this point, bilateral submucosal flaps were elevated off  of the bony septum and a large segment of vomer was removed that  comprised a fairly sharp septal spur to the left side. Once this was  performed, the septal flaps were replaced and the nasal cavity  appeared to be much more patent. The septal incision was closed with  4-0 chromic in a simple interrupted fashion. The inferior turbinates were now addressed. The anterior aspects of  the inferior turbinates were injected with a small amount of 1%  lidocaine with epinephrine (about 0.5 cc per side). A stab incision was made at the anterior  aspect of the right inferior turbinate and a submucosal plane was developed. The Medtronic 2 mm StraightShot microdebrider blade was then  inserted into the tunnel created in the right inferior turbinate and  advanced to the posterior aspect of the turbinate. The microdebrider  was then slowly removed and the submucosal tissue was resected. This was then performed on the left side in a similar manner. There  were no complications.  The inferior turbinates were then outfractured using a Mcintosh displacer and [place] elevator. The nasal cavities were irrigated with saline ensuring adequate hemostasis. Any residual debris and blood was suctioned free from the nasal cavities and nasopharynx. Two Wright splints were very lightly coated with Bacitracin ointment. The stents were then placed bilaterally and secured to the membranous septum with a 2-0 silk  suture. The drapes were removed. The table was returned to its  original position. The patient was awakened and extubated without  event. The patient was safely transferred to the post-anesthesia care unit. There were no known intraoperative complications.            Bryson Fabian MD

## 2021-04-01 NOTE — H&P
Date of Surgery Update:  Geoffrey Richard was seen and examined. History and physical has been reviewed. The patient has been examined.  There have been no significant clinical changes since the completion of the originally dated History and Physical.    Signed By: Tanis Dance, MD     April 1, 2021 7:24 AM

## 2021-04-01 NOTE — DISCHARGE INSTRUCTIONS
Patient Discharge Instructions    Barbara Doty / [id number] : 1985    Admitted 2021 Discharged: 2021       Patient Discharge Instructions - Septoplasty      GENERAL OVERVIEW:  Nasal airway obstruction can be alleviated through an operative procedure that straightens the septum and reduces the size of the inferior turbinates. The septum is the internal structure of the nose that divides one nostril from the other and can become crooked or deviated, causing airway obstruction. Inferior turbinates are structures inside the nose that warm and humidify the air. They can become enlarged due to nasal allergies, and therefore impinge upon the airway. There are three sets of turbinates that make up part of the airway, but the inferior turbinates are the ones that are most commonly obstructive. SWELLING:  Every operation, no matter how minor, is accompanied by swelling of the surrounding tissues. The degree of swelling varies from person to person, and with the amount of surgery required. Staying upright (sitting, standing, walking around) as much as possible is important after you leave the hospital.  Avoid bending over or lifting heavy objects for at least one week as it may aggravate swelling or cause bleeding. Avoid hitting or bumping your nose. Sleep with the head of the bed elevated for approximately 3-5 days. You may place an additional pillow under your head to accomplish this. Avoid sniffing, that is, forcibly attempting to pull air through the nose. This will not relieve the sensation of blockage. As the swelling of tissues decreases, your airway will improve. Avoid rubbing the nostrils and base of the nose as this may cause infection or bleeding. NASAL PACKING:  Frequently following nasal septal surgery, soft plastic stents (Wright splints) are placed in each nasal cavity to prevent swelling and blood collection around the surgical site.   These stents are secured to the inside of the nose with a suture so they should not be able to be moved. Built into each stent is a narrow tunnel that, theoretically, should provide some space for nasal breathing. By gently 'flushing' the tunnel with saline or sterile water, the stents should remain open and allow for some semblance of an airway. They are typically removed in the office about 4-5 days following the surgery. BLEEDING:   It is not uncommon to experience a small amount of bleeding from the nose and post-nasal region (back of throat) for the first 1-3 days following surgery. Keeping the head elevated may minimize the bleeding. A nasal drip pad may be placed prior to discharge from the hospital.  This can be changed as needed. If the bleeding is more than just 'spotting,' oxymetazoline (Afrin) or phenylephrine (NeoSynephrine) nasal spray may be use. Please dose as instructed on the package. Report any heavy bleeding to your surgeon. PAIN:  Pain is generally mild to moderate following nasal septal surgery. For mild pain, OTC analgesics such as acetaminophen (Tylenol) and/or ibuprofen (Advi, Motrin) can be quite effective. For more severe pain, a prescription analgesic, if provided, can be used with caution. Please do not take aspirin or any aspirin containing medication. MEDICATIONS:  You will usually be prescribed pain medication and an oral antibiotic. Nasal saline can be used as described above. A small amount of antibiotic ointment, such as Neosporin or Bacitracin,  should be applied inside each nostril 2-3 times daily. It should be safe to resume your preoperative medications immediately following surgery. If any blood thinners are used (ie., coumadin, Plavix, or adult-strength aspirin), please check with your surgeon regarding a safe time to restart. RESUMING ACTIVITIES:  Your exercise regimen must be lessened to some extent for the first 8 days following surgery.  Strenuous exercise is discouraged as it may increase pressure and blood blow to the nose, possibly causing more pain, swelling, and bleeding. Walking and light exercise is permissible. YOUR FIRST POSTOPERATIVE OFFICE VISIT:  Your first visit should be about 4-5 days following surgery. If not already scheduled, please call the office at [phone] to schedule the visit. RETURN TO WORK OR SCHOOL:  The average patient is able to return to school or work three to five days following the surgery. Physical activity will be curtailed, as discussed above. LONG-TERM CARE:  Please realize that the septum and turbinates may require six full weeks for complete healing. Often there will be crusting at the healing sites that will need to be removed by the doctor. This may require multiple  visits for the first six weeks. This is normal and should not be cause for alarm. The crusts may interfere with proper breathing, so it is important to keep these visits. Continued use of saline irrigations will aid in reducing the amount of crusting. If you have any questions, please call us at [phone]. We are always happy to answer your questions, and if you should have a problem, this number is answered 24 hours a day. DISCHARGE SUMMARY from your Nurse      PATIENT INSTRUCTIONS    After general anesthesia or intravenous sedation, for 24 hours or while taking prescription Narcotics:  · Limit your activities  · Do not drive and operate hazardous machinery  · Do not make important personal or business decisions  · Do  not drink alcoholic beverages  · If you have not urinated within 8 hours after discharge, please contact your surgeon on call.     Report the following to your surgeon:  · Excessive pain, swelling, redness or odor of or around the surgical area  · Temperature over 100.5  · Nausea and vomiting lasting longer than 4 hours or if unable to take medications  · Any signs of decreased circulation or nerve impairment to extremity: change in color, persistent  numbness, tingling, coldness or increase pain  · Any questions      GOOD HELP TO FIGHT AN INFECTION  Here are a few tip to help reduce the chance of getting an infection after surgery:   Wash Your Hands   Good handwashing is the most important thing you and your caregiver can do.  Wash before and after caring for any wounds. Dry your hand with a clean towel.  Wash with soap and water for at least 20 seconds. A TIP: sing the \"Happy Birthday\" song through one time while washing to help with the timing.  Use a hand  in between washings.  Shower   When your surgeon says it is OK to take a shower, use a new bar of antibacterial soap (if that is what you use, and keep that bar of soap ONLY for your use), or antibacterial body wash.  Use a clean wash cloth or sponge when you bathe.  Dry off with a clean towel  after every bath - be careful around any wounds, skin staples, sutures or surgical glue over/on wounds.  Do not enter swimming pools, hot tubs, lakes, rivers and/or ocean until wounds are healed and your doctor/surgeon says it is OK.  Use Clean Sheets   Sleep on freshly laundered sheets after your surgery.  Keep the surgery site covered with a clean, dry bandage (if instructed to do so). If the bandage becomes soiled, reapply a new, dry, clean bandage.  Do not allow pets to sleep with you while your wound is healing.  Lifestyle Modification and Controlling Your Blood Sugar   Smoking slows wound healing. Stop smoking and limit exposure to second-hand smoke.  High blood sugar slows wound healing. Eat a well-balanced diet to provide proper nutrition while healing   Monitor your blood sugar (if you are a diabetic) and take your medications as you are suppose to so you can control you blood sugar after surgery. COUGH AND DEEP BREATHE    Breathing deeply and coughing are very important exercises to do after surgery.   Deep breathing and coughing open the little air tubes and air sacks in your lungs. You take deep breaths every day. You may not even notice - it is just something you do when you sigh or yawn. It is a natural exercise you do to keep these air passages open. After surgery, take deep breaths and cough, on purpose. DIRECTIONS:  · Take 10 to 15 slow deep breaths every hour while awake. · Breathe in deeply, and hold it for 2 seconds. · Exhale slowly through puckered lips, like blowing up a balloon. · After every 4th or 5th deep breath, hug your pillow to your chest or belly and give a hard, deep cough. Yes, it will probably hurt. But doing this exercise is a very important part of healing after surgery. Take your pain medicine to help you do this exercise without too much pain. Coughing and deep breathing help prevent bronchitis and pneumonia after surgery. If you had chest or belly surgery, use a pillow as a \"hug brandon\" and hold it tightly to your chest or belly when you cough. ANKLE PUMPS    Ankle pumps increase the circulation of oxygenated blood to your lower extremities and decrease your risk for circulation problems such as blood clots. They also stretch the muscles, tendons and ligaments in your foot and ankle, and prevent joint contracture in the ankle and foot, especially after surgeries on the legs. It is important to do ankle pump exercises regularly after surgery because immobility increases your risk for developing a blood clot. Your doctor may also have you take an Aspirin for the next few days as well. If your doctor did not ask you to take an Aspirin, consult with him before starting Aspirin therapy on your own. The exercise is quite simple. · Slowly point your foot forward, feeling the muscles on the top of your lower leg stretch, and hold this position for 5 seconds.                   · Next, pull your foot back toward you as far as possible, stretching the calf muscles, and hold that position for 5 seconds. · Repeat with the other foot. · Perform 10 repetitions every hour while awake for both ankles if possible (down and then up with the foot once is one repetition). You should feel gentle stretching of the muscles in your lower leg when doing this exercise. If you feel pain, or your range of motion is limited, don't push too hard. Only go the limit your joint and muscles will let you go. If you have increasing pain, progressively worsening leg warmth or swelling, STOP the exercise and call your doctor. MEDICATION AND   SIDE EFFECT GUIDE    The [facility] MEDICATION AND SIDE EFFECT GUIDE was provided to the PATIENT AND CARE PROVIDER. Information provided includes instruction about drug purpose and common side effects for the following medications:   · 3 Prescriptions sent to pharmacy per Dr. Leyla Cordova         These are general instructions for a healthy lifestyle:    *   Please give a list of your current medications to your Primary Care Provider. *   Please update this list whenever your medications are discontinued, doses are changed, or new medications (including over-the-counter products) are added. *   Please carry medication information at all times in case of emergency situations. About Smoking  No smoking / No tobacco products  Avoid exposure to second hand smoke     Surgeon General's Warning:  Quitting smoking now greatly reduces serious risk to your health. Obesity, smoking, and sedentary lifestyle greatly increases your risk for illness and disease. A healthy diet, regular physical exercise & weight monitoring are important for maintaining a healthy lifestyle.       Congestive Heart Failure  You may be retaining fluid if you have a history of heart failure or if you experience any of the following symptoms:  Weight gain of 3 pounds or more overnight or 5 pounds in a week, increased swelling in your hands or feet or shortness of breath while lying flat in bed. Please call your doctor as soon as you notice any of these symptoms; do not wait until your next office visit. Recognize signs and symptoms of STROKE:  F -  Face looks uneven  A -  Arms unable to move or move evenly  S -  Speech slurred or non-existent  T -  Time-call 911 as soon as signs and symptoms begin-DO NOT go          back to bed or wait to see if you get better-TIME IS BRAIN. Warning Signs of HEART ATTACK   Call 911 if you have these symptoms:     Chest discomfort. Most heart attacks involve discomfort in the center of the chest that lasts more than a few minutes, or that goes away and comes back. It can feel like uncomfortable pressure, squeezing, fullness, or pain.  Discomfort in other areas of the upper body. Symptoms can include pain or discomfort in one or both arms, the back, neck, jaw, or stomach.  Shortness of breath with or without chest discomfort.  Other signs may include breaking out in a cold sweat, nausea, or lightheadedness. Don't wait more than five minutes to call 911 - MINUTES MATTER! Fast action can save your life. Calling 911 is almost always the fastest way to get lifesaving treatment. Emergency Medical Services staff can begin treatment when they arrive -- up to an hour sooner than if someone gets to the hospital by car. Learning About Coronavirus [phone])  Coronavirus [phone]): Overview  What is coronavirus (COVID-19)? The coronavirus disease (COVID-19) is caused by a virus. It is an illness that was first found in [place], [place], in December 2019. It has since spread worldwide. The virus can cause fever, cough, and trouble breathing. In severe cases, it can cause pneumonia and make it hard to breathe without help. It can cause death. Coronaviruses are a large group of viruses. They cause the common cold. They also cause more serious illnesses like Middle East respiratory syndrome (MERS) and severe acute respiratory syndrome (SARS). COVID-19 is caused by a novel coronavirus. That means it's a new type that has not been seen in people before. This virus spreads person-to-person through droplets from coughing and sneezing. It can also spread when you are close to someone who is infected. And it can spread when you touch something that has the virus on it, such as a doorknob or a tabletop. What can you do to protect yourself from coronavirus (COVID-19)? The best way to protect yourself from getting sick is to:  · Avoid areas where there is an outbreak. · Avoid contact with people who may be infected. · Wash your hands often with soap or alcohol-based hand sanitizers. · Avoid crowds and try to stay at least 6 feet away from other people. · Wash your hands often, especially after you cough or sneeze. Use soap and water, and scrub for at least 20 seconds. If soap and water aren't available, use an alcohol-based hand . · Avoid touching your mouth, nose, and eyes. What can you do to avoid spreading the virus to others? To help avoid spreading the virus to others:  · Cover your mouth with a tissue when you cough or sneeze. Then throw the tissue in the trash. · Use a disinfectant to clean things that you touch often. · Stay home if you are sick or have been exposed to the virus. Don't go to school, work, or public areas. And don't use public transportation. · If you are sick:  ? Leave your home only if you need to get medical care. But call the doctor's office first so they know you're coming. And wear a face mask, if you have one.  ? If you have a face mask, wear it whenever you're around other people. It can help stop the spread of the virus when you cough or sneeze. ? Clean and disinfect your home every day. Use household  and disinfectant wipes or sprays. Take special care to clean things that you grab with your hands. These include doorknobs, remote controls, phones, and handles on your refrigerator and microwave.  And don't forget countertops, tabletops, bathrooms, and computer keyboards. When to call for help  Call 911 anytime you think you may need emergency care. For example, call if:  · You have severe trouble breathing. (You can't talk at all.)  · You have constant chest pain or pressure. · You are severely dizzy or lightheaded. · You are confused or can't think clearly. · Your face and lips have a blue color. · You pass out (lose consciousness) or are very hard to wake up. Call your doctor now if you develop symptoms such as:  · Shortness of breath. · Fever. · Cough. If you need to get care, call ahead to the doctor's office for instructions before you go. Make sure you wear a face mask, if you have one, to prevent exposing other people to the virus. Where can you get the latest information? The following health organizations are tracking and studying this virus. Their websites contain the most up-to-date information. Vaibhav Justice also learn what to do if you think you may have been exposed to the virus. · U.S. Centers for Disease Control and Prevention (CDC): The CDC provides updated news about the disease and travel advice. The website also tells you how to prevent the spread of infection. www.cdc.gov  · World Health Organization [facility]): WHO offers information about the virus outbreaks. WHO also has travel advice. www.who.int  Current as of: April 1, 2020               Content Version: 12.4  © 0668-9951 [employer]. Care instructions adapted under license by your healthcare professional. If you have questions about a medical condition or this instruction, always ask your healthcare professional. [address] any warranty or liability for your use of this information. The discharge information has been reviewed with the spouse. Any questions and concerns from the spouse have been addressed. The spouse verbalized understanding.         CONTENTS FOUND IN YOUR DISCHARGE ENVELOPE:  [x]     Surgeon and Hospital Discharge Instructions  [x]     [facility] Care Provider Card  []     Medication & Side Effect Guide            (your newly prescribed medications have been marked/highlighted showing the most common side effects from   the classes of drugs on your prescriptions)  []     Medication Prescription(s) x 3 ( [] These have been sent electronically to your pharmacy by your surgeon,   []     [address]  []     Physical Therapy Prescription  []     Follow-up Appointment Cards  []     Surgery-related Pictures/Media  []     Pain block and/or block with On-Q Catheter from Anesthesia Service (information included in your instructions above)  []     Medical device information sheets/pamphlets from their    []     School/work excuse note. []     /parent work excuse note. The following personal items collected during your admission are returned to you:   Dental Appliance: Dental Appliances: None  Vision:    Hearing Aid:    Jewelry: Jewelry: None  Clothing: Clothing: Footwear, Pants, Shirt, Undergarments  Other Valuables:  Other Valuables: None  Valuables sent to safe:

## 2021-07-07 ENCOUNTER — OFFICE VISIT (OUTPATIENT)
Dept: OBGYN CLINIC | Age: 36
End: 2021-07-07
Payer: COMMERCIAL

## 2021-07-07 VITALS
HEIGHT: 67 IN | DIASTOLIC BLOOD PRESSURE: 76 MMHG | WEIGHT: 136 LBS | BODY MASS INDEX: 21.35 KG/M2 | SYSTOLIC BLOOD PRESSURE: 122 MMHG

## 2021-07-07 DIAGNOSIS — N92.6 IRREGULAR MENSES: Primary | ICD-10-CM

## 2021-07-07 PROCEDURE — 99203 OFFICE O/P NEW LOW 30 MIN: CPT | Performed by: OBSTETRICS & GYNECOLOGY

## 2021-07-07 RX ORDER — MELATONIN
DAILY
Status: ON HOLD | COMMUNITY
End: 2022-04-18

## 2021-07-07 RX ORDER — CHOLECALCIFEROL (VITAMIN D3) 125 MCG
100 CAPSULE ORAL DAILY
Status: ON HOLD | COMMUNITY
End: 2022-04-18

## 2021-07-07 RX ORDER — CETIRIZINE HYDROCHLORIDE 10 MG/1
CAPSULE, LIQUID FILLED ORAL
COMMUNITY

## 2021-07-07 RX ORDER — ASCORBIC ACID 250 MG
TABLET ORAL
Status: ON HOLD | COMMUNITY
End: 2022-04-18

## 2021-07-07 NOTE — PROGRESS NOTES
New Patient Problem Visit    Santa Patel is a 28 y.o.  presenting for a problem visit. Her main concern today is irregular menstrual cycles. She has been trying to conceive her 4th pregnancy for the last 3-4 months, is concerned that her last 2 cycles have been irregular. She previously had 30 day cycles with positive OPKs on CD 19 (by LH strips). Following her 1st Covid vaccine dose [employer]) in May, she had a longer cycle than usual with late OPK, followed by a 28 day cycle with missed positive OPK. Her menses started 3 months postpartum despite exclusively breastfeeding. [facility] nursing 2020. Her normal cycles are 30 days in intervals with positive LH surge on day 20. She tracks this every month and does not use contraception. She also reports she has been having difficulty with spells of anxiety, and one \"anxiety attack\" that occurred last week while driving, she needed to call an emergency Psych line through her 's work. Pt states she experienced numbness/tingling in her arms and chest tightening. A [employer] NP at [phone] recommended she stop using cycle tracking tools for awhile to see if this improved her anxiety. They checked a day 21 progesterone level and it was normal.      She is currently staying at home with her 3 children, was previously a nurse.       Ob/Gyn Hx:   - hx  followed by c/s x 2, youngest is 3years old  LMP- 21  Menses- recently slightly irregular  Contraception- none, trying to conceive  SA- yes      Past Medical History:   Diagnosis Date    PONV (postoperative nausea and vomiting)     Tachycardia     Unspecified breast disorder 2015    lumpectomy  right breast cyst    Unspecified breast disorder     axillary node dissection    Unspecified breast disorder     left breast biopsy    Unspecified breast disorder     BRCA test negative    Uterine prolapse affecting pregnancy in second trimester, antepartum 9/3/2015    1 degree cystocele and descent of cervix to introitus with prolonged standing and straining.  Fitted with #4 Ring pessary with support       Past Surgical History:   Procedure Laterality Date    HX ACL RECONSTRUCTION Right     HX  SECTION      x 2    HX TONSILLECTOMY      [facility] BREAST SURGERY PROCEDURE UNLISTED         Family History   Problem Relation Age of Onset   Jesse Galla Breast Cancer Mother 45         age 36    Breast Cancer Maternal Grandmother 36        reoccured 2-3 times    Other Father 36        parkinsons    Other Brother         bipolar    Hypertension Maternal Grandfather 46        MI    Diabetes Paternal Grandmother 66        unsure of diabete type; liver disease       Social History     Socioeconomic History    Marital status:      Spouse name: Not on file    Number of children: Not on file    Years of education: Not on file    Highest education level: Not on file   Occupational History    Occupation: Stay at home mom     Comment: retired NICU nurse   Tobacco Use    Smoking status: Never Smoker    Smokeless tobacco: Never Used   Substance and Sexual Activity    Alcohol use: No    Drug use: Never    Sexual activity: Yes     Partners: Male     Birth control/protection: None   Other Topics Concern     Service No    Blood Transfusions No    Caffeine Concern No    Occupational Exposure No    Hobby Hazards No    Sleep Concern No    Stress Concern No    Weight Concern No    Special Diet Yes     Comment: soy and corn and sesame seeds shell fish allergies    Back Care No    Exercise Yes     Comment: 30 min cardio/30 min weights 3 x week    Bike Helmet Yes    Seat Belt Yes    Self-Exams Yes   Social History Narrative    Not on file     Social Determinants of Health     Financial Resource Strain:     Difficulty of Paying Living Expenses:    Food Insecurity:     Worried About Running Out of Food in the Last Year:     Zach of Food in the Last Year:    Transportation Needs:  Lack of Transportation (Medical):  Lack of Transportation (Non-Medical):    Physical Activity:     Days of Exercise per Week:     Minutes of Exercise per Session:    Stress:     Feeling of Stress :    Social Connections:     Frequency of Communication with Friends and Family:     Frequency of Social Gatherings with Friends and Family:     Attends Yazdanism Services:     Active Member of Clubs or Organizations:     Attends Club or Organization Meetings:     Marital Status:    Intimate Partner Violence:     Fear of Current or Ex-Partner:     Emotionally Abused:     Physically Abused:     Sexually Abused:        Current Outpatient Medications   Medication Sig Dispense Refill    Cetirizine (ZyrTEC) 10 mg cap Take  by mouth.  ascorbic acid, vitamin C, (Vitamin C) 250 mg tablet Take  by mouth.  cholecalciferol (Vitamin D3) (1000 Units /25 mcg) tablet Take  by mouth daily.  co-enzyme Q-10 (CO Q-10) 100 mg capsule Take 100 mg by mouth daily.  LACTOBACILLUS ACIDOPHILUS (PROBIOTIC PO) Take  by mouth.       prenatal vit-iron fumarate-fa (PRENATAL VITAMIN WITH MINERALS) 28-0.8 mg tab Take 1 tablet by mouth daily,         Allergies   Allergen Reactions    Shellfish Derived Anaphylaxis    Soy Nausea and Vomiting       Review of Systems - History obtained from the patient  Constitutional: negative for weight loss, fever, night sweats  HEENT: negative for hearing loss, earache, congestion, snoring, sorethroat  CV: negative for chest pain, palpitations, edema  Resp: negative for cough, shortness of breath, wheezing  GI: negative for change in bowel habits, abdominal pain, black or bloody stools  : negative for frequency, dysuria, hematuria, vaginal discharge  MSK: negative for back pain, joint pain, muscle pain  Breast: negative for breast lumps, nipple discharge, galactorrhea  Skin :negative for itching, rash, hives  Neuro: negative for dizziness, headache, confusion, weakness  Psych: negative for anxiety, depression, change in mood  Heme/lymph: negative for bleeding, bruising, pallor    Physical Exam    Visit Vitals  /76 (BP 1 Location: Left arm, BP Patient Position: Sitting)   Ht 5' 7\" (1.702 m)   Wt 136 lb (61.7 kg)   LMP 06/20/2021   BMI 21.30 kg/m²         OBGyn Exam      Constitutional  · Appearance: well-nourished, well developed, alert, in no acute distress    HENT  · Head and Face: appears normal    Neck  · Inspection/Palpation: normal appearance, no masses or tenderness  · Thyroid: gland size normal, nontender    Chest  · Respiratory Effort: non-labored breathing    Cardiovascular  · Extremities: no peripheral edema    Gastrointestinal  · Abdominal Examination: abdomen non-distended, non-tender to palpation, no masses present  · Liver and spleen: no hepatomegaly present, spleen not palpable  · Hernias: no hernias identified      Skin  · General Inspection: no rash, no lesions identified    Neurologic/Psychiatric  · Mental Status:  · Orientation: grossly oriented to person, place and time  · Mood and Affect: mood normal, affect appropriate      Assessment/Plan:    1. Irregular menses  We discussed her menses and menstrual history at length. Menses since delivery of her youngest child have been regular but with a short approx 10 day luteal phase (assuming her LH strips are accurate). Discussed COVID vaccine's impact on menstrual changes the following month. Recent prelim workup at [facility] is reported normal.  Discussed checking day 3 labs, mid-luteal phase progesterone. Advised likely she just needs more time, but if low mid-luteal progesterone, will consider supplemental progesterone in the luteal phase.          Arik Castle MD

## 2021-07-07 NOTE — PATIENT INSTRUCTIONS
Learning About Planning for Future Pregnancy  How can you plan for pregnancy? Even before you get pregnant, you can help make your pregnancy as healthy as possible. Take these steps:  · See a doctor or certified nurse-midwife for an exam. Talk about the medicines, vitamins, and herbs you take. Discuss any health problems or concerns you have. · Don't take nonsteroidal anti-inflammatory drugs (NSAIDs) unless your doctor says it's okay. Examples of these are ibuprofen and aspirin. They can raise your risk of miscarriage. · Take a daily multivitamin or prenatal vitamin. Make sure it has folic acid. This will lower the chance of having a baby with a birth defect. · Keep track of your menstrual cycle. This is a good idea for a few reasons. It helps you know the best time to try to get pregnant. And it can help your doctor or midwife figure out when your baby is due and how it is growing. · Make healthy choices. Eat well. Avoid caffeine. Or cut back and only have 1 cup of coffee or tea a day. Avoid alcohol, cigarettes, marijuana, and illegal drugs. Take only the medicines your doctor or midwife says are okay. · Get plenty of exercise. A strong body will make pregnancy and birth easier. It will also help you recover after the birth. And exercise can help improve your mood. What other exams or tests should you have? Before trying to get pregnant, take care of any other health concerns you might have. · If you have diabetes or high blood pressure or you are obese, talk to your doctor before you get pregnant. Together, you can make a plan about how to control these health problems. · Talk with your doctor about any medicines you take. Find out if it is safe to keep taking them while you are pregnant. · Get any vaccines you might need. They can help prevent birth defects, miscarriage, or stillbirth that can be caused by infections such as rubella or measles.  Ask your doctor how long you should wait after you get a vaccine before you try to get pregnant. · Talk with your doctor about whether to have screening tests for diseases that are passed down through families (genetic conditions). These diseases include:  ? Cystic fibrosis. ? Sickle cell disease. ? Ha-Sachs disease. If you think you might be pregnant  · You can use a home pregnancy test as soon as the first day of your first missed menstrual period. · As soon as you know you're pregnant, make an appointment with your doctor or certified nurse-midwife. Your first prenatal visit will provide information that can be used to check for any problems as your pregnancy progresses. Where can you learn more? Go to http://www.gray.com/  Enter I965 in the search box to learn more about \"Learning About Planning for Future Pregnancy. \"  Current as of: October 8, 2020               Content Version: 12.8  © 4105-7735 Healthwise, Incorporated. Care instructions adapted under license by [employer] (which disclaims liability or warranty for this information). If you have questions about a medical condition or this instruction, always ask your healthcare professional. [address] any warranty or liability for your use of this information.

## 2021-07-21 ENCOUNTER — LAB ONLY (OUTPATIENT)
Dept: OBGYN CLINIC | Age: 36
End: 2021-07-21

## 2021-07-21 DIAGNOSIS — N92.6 IRREGULAR MENSES: Primary | ICD-10-CM

## 2021-07-22 LAB
ESTRADIOL SERPL-MCNC: 34.1 PG/ML
FSH SERPL-ACNC: 6.6 MIU/ML
LH SERPL-ACNC: 3.9 MIU/ML
PROLACTIN SERPL-MCNC: 5.8 NG/ML
T4 FREE SERPL-MCNC: 1.2 NG/DL (ref 0.8–1.5)
TSH SERPL DL<=0.05 MIU/L-ACNC: 1.7 UIU/ML (ref 0.36–3.74)

## 2021-07-26 LAB — MIS SERPL-MCNC: 3.94 NG/ML

## 2021-09-03 LAB
CHLAMYDIA, EXTERNAL: NEGATIVE
N. GONORRHEA, EXTERNAL: NEGATIVE

## 2021-09-09 LAB
ANTIBODY SCREEN, EXTERNAL: NEGATIVE
HBSAG, EXTERNAL: NEGATIVE
HEPATITIS C AB,   EXT: NEGATIVE
HIV, EXTERNAL: NON REACTIVE
RPR, EXTERNAL: NON REACTIVE
RUBELLA, EXTERNAL: NORMAL

## 2022-03-18 PROBLEM — J34.2 DEVIATED SEPTUM: Status: ACTIVE | Noted: 2021-04-01

## 2022-03-19 PROBLEM — Z34.90 PREGNANCY: Status: ACTIVE | Noted: 2019-04-29

## 2022-03-31 LAB — GRBS, EXTERNAL: NEGATIVE

## 2022-04-18 ENCOUNTER — HOSPITAL ENCOUNTER (INPATIENT)
Age: 37
LOS: 2 days | Discharge: HOME | End: 2022-04-20
Attending: OBSTETRICS & GYNECOLOGY | Admitting: OBSTETRICS & GYNECOLOGY
Payer: COMMERCIAL

## 2022-04-18 ENCOUNTER — ANESTHESIA (OUTPATIENT)
Dept: LABOR AND DELIVERY | Age: 37
End: 2022-04-18
Payer: COMMERCIAL

## 2022-04-18 ENCOUNTER — ANESTHESIA EVENT (OUTPATIENT)
Dept: LABOR AND DELIVERY | Age: 37
End: 2022-04-18
Payer: COMMERCIAL

## 2022-04-18 DIAGNOSIS — Z98.891 S/P CESAREAN SECTION: Primary | ICD-10-CM

## 2022-04-18 PROBLEM — Z3A.39 39 WEEKS GESTATION OF PREGNANCY: Status: ACTIVE | Noted: 2022-04-18

## 2022-04-18 PROBLEM — Z34.90 PREGNANCY: Status: RESOLVED | Noted: 2019-04-29 | Resolved: 2022-04-18

## 2022-04-18 PROBLEM — O09.523 AMA (ADVANCED MATERNAL AGE) MULTIGRAVIDA 35+, THIRD TRIMESTER: Status: ACTIVE | Noted: 2022-04-18

## 2022-04-18 PROBLEM — J34.2 DEVIATED SEPTUM: Status: RESOLVED | Noted: 2021-04-01 | Resolved: 2022-04-18

## 2022-04-18 PROBLEM — O34.219 PREVIOUS CESAREAN DELIVERY AFFECTING PREGNANCY: Status: ACTIVE | Noted: 2022-04-18

## 2022-04-18 LAB
ABO + RH BLD: NORMAL
BASOPHILS # BLD: 0 K/UL (ref 0–0.1)
BASOPHILS NFR BLD: 0 % (ref 0–1)
BLOOD GROUP ANTIBODIES SERPL: NORMAL
DIFFERENTIAL METHOD BLD: ABNORMAL
EOSINOPHIL # BLD: 0 K/UL (ref 0–0.4)
EOSINOPHIL NFR BLD: 1 % (ref 0–7)
ERYTHROCYTE [DISTWIDTH] IN BLOOD BY AUTOMATED COUNT: 13.4 % (ref 11.5–14.5)
HCT VFR BLD AUTO: 36.3 % (ref 35–47)
HGB BLD-MCNC: 12 G/DL (ref 11.5–16)
IMM GRANULOCYTES # BLD AUTO: 0.2 K/UL (ref 0–0.04)
IMM GRANULOCYTES NFR BLD AUTO: 2 % (ref 0–0.5)
LYMPHOCYTES # BLD: 2.7 K/UL (ref 0.8–3.5)
LYMPHOCYTES NFR BLD: 30 % (ref 12–49)
MCH RBC QN AUTO: 31.8 PG (ref 26–34)
MCHC RBC AUTO-ENTMCNC: 33.1 G/DL (ref 30–36.5)
MCV RBC AUTO: 96.3 FL (ref 80–99)
MONOCYTES # BLD: 0.7 K/UL (ref 0–1)
MONOCYTES NFR BLD: 8 % (ref 5–13)
NEUTS SEG # BLD: 5.3 K/UL (ref 1.8–8)
NEUTS SEG NFR BLD: 59 % (ref 32–75)
NRBC # BLD: 0 K/UL (ref 0–0.01)
NRBC BLD-RTO: 0 PER 100 WBC
PLATELET # BLD AUTO: 196 K/UL (ref 150–400)
PMV BLD AUTO: 11.2 FL (ref 8.9–12.9)
RBC # BLD AUTO: 3.77 M/UL (ref 3.8–5.2)
SPECIMEN EXP DATE BLD: NORMAL
WBC # BLD AUTO: 8.9 K/UL (ref 3.6–11)

## 2022-04-18 PROCEDURE — 74011250636 HC RX REV CODE- 250/636: Performed by: ANESTHESIOLOGY

## 2022-04-18 PROCEDURE — 77030005513 HC CATH URETH FOL11 MDII -B

## 2022-04-18 PROCEDURE — 2709999900 HC NON-CHARGEABLE SUPPLY

## 2022-04-18 PROCEDURE — 74011250636 HC RX REV CODE- 250/636: Performed by: OBSTETRICS & GYNECOLOGY

## 2022-04-18 PROCEDURE — 77030018809 HC RETRCTR ALXSO DISP AMR -B

## 2022-04-18 PROCEDURE — 74011000250 HC RX REV CODE- 250: Performed by: OBSTETRICS & GYNECOLOGY

## 2022-04-18 PROCEDURE — 76010000392 HC C SECN EA ADDL 0.5 HR: Performed by: OBSTETRICS & GYNECOLOGY

## 2022-04-18 PROCEDURE — 74011000250 HC RX REV CODE- 250: Performed by: ANESTHESIOLOGY

## 2022-04-18 PROCEDURE — 77030007866 HC KT SPN ANES BBMI -B: Performed by: ANESTHESIOLOGY

## 2022-04-18 PROCEDURE — 76060000078 HC EPIDURAL ANESTHESIA: Performed by: OBSTETRICS & GYNECOLOGY

## 2022-04-18 PROCEDURE — 77030010507 HC ADH SKN DERMBND J&J -B

## 2022-04-18 PROCEDURE — 86900 BLOOD TYPING SEROLOGIC ABO: CPT

## 2022-04-18 PROCEDURE — 76010000391 HC C SECN FIRST 1 HR: Performed by: OBSTETRICS & GYNECOLOGY

## 2022-04-18 PROCEDURE — 75410000003 HC RECOV DEL/VAG/CSECN EA 0.5 HR: Performed by: OBSTETRICS & GYNECOLOGY

## 2022-04-18 PROCEDURE — 85025 COMPLETE CBC W/AUTO DIFF WBC: CPT

## 2022-04-18 PROCEDURE — 77030040361 HC SLV COMPR DVT MDII -B

## 2022-04-18 PROCEDURE — 36415 COLL VENOUS BLD VENIPUNCTURE: CPT

## 2022-04-18 PROCEDURE — 74011250637 HC RX REV CODE- 250/637: Performed by: OBSTETRICS & GYNECOLOGY

## 2022-04-18 PROCEDURE — 65270000029 HC RM PRIVATE

## 2022-04-18 RX ORDER — DOCUSATE SODIUM 100 MG/1
100 CAPSULE, LIQUID FILLED ORAL 2 TIMES DAILY
Status: DISCONTINUED | OUTPATIENT
Start: 2022-04-18 | End: 2022-04-20 | Stop reason: HOSPADM

## 2022-04-18 RX ORDER — DIPHENHYDRAMINE HYDROCHLORIDE 50 MG/ML
12.5 INJECTION, SOLUTION INTRAMUSCULAR; INTRAVENOUS
Status: DISCONTINUED | OUTPATIENT
Start: 2022-04-18 | End: 2022-04-20 | Stop reason: HOSPADM

## 2022-04-18 RX ORDER — OXYTOCIN/RINGER'S LACTATE 30/500 ML
10 PLASTIC BAG, INJECTION (ML) INTRAVENOUS AS NEEDED
Status: DISCONTINUED | OUTPATIENT
Start: 2022-04-18 | End: 2022-04-18

## 2022-04-18 RX ORDER — SODIUM CHLORIDE, SODIUM LACTATE, POTASSIUM CHLORIDE, CALCIUM CHLORIDE 600; 310; 30; 20 MG/100ML; MG/100ML; MG/100ML; MG/100ML
1000 INJECTION, SOLUTION INTRAVENOUS CONTINUOUS
Status: DISCONTINUED | OUTPATIENT
Start: 2022-04-18 | End: 2022-04-18

## 2022-04-18 RX ORDER — ZOLPIDEM TARTRATE 5 MG/1
5 TABLET ORAL
Status: DISCONTINUED | OUTPATIENT
Start: 2022-04-18 | End: 2022-04-20 | Stop reason: HOSPADM

## 2022-04-18 RX ORDER — FENTANYL CITRATE 50 UG/ML
INJECTION, SOLUTION INTRAMUSCULAR; INTRAVENOUS AS NEEDED
Status: DISCONTINUED | OUTPATIENT
Start: 2022-04-18 | End: 2022-04-18 | Stop reason: HOSPADM

## 2022-04-18 RX ORDER — OXYCODONE HYDROCHLORIDE 5 MG/1
10 TABLET ORAL
Status: DISCONTINUED | OUTPATIENT
Start: 2022-04-18 | End: 2022-04-18

## 2022-04-18 RX ORDER — ONDANSETRON 4 MG/1
4 TABLET, ORALLY DISINTEGRATING ORAL
Status: DISCONTINUED | OUTPATIENT
Start: 2022-04-18 | End: 2022-04-20 | Stop reason: HOSPADM

## 2022-04-18 RX ORDER — KETOROLAC TROMETHAMINE 30 MG/ML
30 INJECTION, SOLUTION INTRAMUSCULAR; INTRAVENOUS
Status: DISCONTINUED | OUTPATIENT
Start: 2022-04-18 | End: 2022-04-18

## 2022-04-18 RX ORDER — OXYTOCIN 10 [USP'U]/ML
INJECTION, SOLUTION INTRAMUSCULAR; INTRAVENOUS AS NEEDED
Status: DISCONTINUED | OUTPATIENT
Start: 2022-04-18 | End: 2022-04-18 | Stop reason: HOSPADM

## 2022-04-18 RX ORDER — OXYTOCIN/RINGER'S LACTATE 30/500 ML
87.3 PLASTIC BAG, INJECTION (ML) INTRAVENOUS AS NEEDED
Status: DISCONTINUED | OUTPATIENT
Start: 2022-04-18 | End: 2022-04-18

## 2022-04-18 RX ORDER — DIPHENHYDRAMINE HYDROCHLORIDE 50 MG/ML
12.5 INJECTION, SOLUTION INTRAMUSCULAR; INTRAVENOUS
Status: DISCONTINUED | OUTPATIENT
Start: 2022-04-18 | End: 2022-04-18

## 2022-04-18 RX ORDER — HYDROCODONE BITARTRATE AND ACETAMINOPHEN 5; 325 MG/1; MG/1
1 TABLET ORAL
Status: DISCONTINUED | OUTPATIENT
Start: 2022-04-18 | End: 2022-04-20 | Stop reason: HOSPADM

## 2022-04-18 RX ORDER — MORPHINE SULFATE 0.5 MG/ML
INJECTION, SOLUTION EPIDURAL; INTRATHECAL; INTRAVENOUS AS NEEDED
Status: DISCONTINUED | OUTPATIENT
Start: 2022-04-18 | End: 2022-04-18 | Stop reason: HOSPADM

## 2022-04-18 RX ORDER — KETOROLAC TROMETHAMINE 30 MG/ML
30 INJECTION, SOLUTION INTRAMUSCULAR; INTRAVENOUS EVERY 6 HOURS
Status: DISCONTINUED | OUTPATIENT
Start: 2022-04-18 | End: 2022-04-20 | Stop reason: HOSPADM

## 2022-04-18 RX ORDER — GLYCOPYRROLATE 0.2 MG/ML
INJECTION INTRAMUSCULAR; INTRAVENOUS AS NEEDED
Status: DISCONTINUED | OUTPATIENT
Start: 2022-04-18 | End: 2022-04-18 | Stop reason: HOSPADM

## 2022-04-18 RX ORDER — IBUPROFEN 800 MG/1
800 TABLET ORAL EVERY 8 HOURS
Status: DISCONTINUED | OUTPATIENT
Start: 2022-04-18 | End: 2022-04-20 | Stop reason: HOSPADM

## 2022-04-18 RX ORDER — SODIUM CHLORIDE 0.9 % (FLUSH) 0.9 %
5-40 SYRINGE (ML) INJECTION AS NEEDED
Status: DISCONTINUED | OUTPATIENT
Start: 2022-04-18 | End: 2022-04-20 | Stop reason: HOSPADM

## 2022-04-18 RX ORDER — EPHEDRINE SULFATE/0.9% NACL/PF 50 MG/5 ML
SYRINGE (ML) INTRAVENOUS AS NEEDED
Status: DISCONTINUED | OUTPATIENT
Start: 2022-04-18 | End: 2022-04-18 | Stop reason: HOSPADM

## 2022-04-18 RX ORDER — FLUTICASONE PROPIONATE 110 UG/1
2 AEROSOL, METERED RESPIRATORY (INHALATION)
COMMUNITY

## 2022-04-18 RX ORDER — DEXAMETHASONE SODIUM PHOSPHATE 4 MG/ML
INJECTION, SOLUTION INTRA-ARTICULAR; INTRALESIONAL; INTRAMUSCULAR; INTRAVENOUS; SOFT TISSUE AS NEEDED
Status: DISCONTINUED | OUTPATIENT
Start: 2022-04-18 | End: 2022-04-18 | Stop reason: HOSPADM

## 2022-04-18 RX ORDER — SIMETHICONE 80 MG
80 TABLET,CHEWABLE ORAL
Status: DISCONTINUED | OUTPATIENT
Start: 2022-04-18 | End: 2022-04-20 | Stop reason: HOSPADM

## 2022-04-18 RX ORDER — SODIUM CHLORIDE, SODIUM LACTATE, POTASSIUM CHLORIDE, CALCIUM CHLORIDE 600; 310; 30; 20 MG/100ML; MG/100ML; MG/100ML; MG/100ML
125 INJECTION, SOLUTION INTRAVENOUS CONTINUOUS
Status: DISCONTINUED | OUTPATIENT
Start: 2022-04-18 | End: 2022-04-20 | Stop reason: HOSPADM

## 2022-04-18 RX ORDER — OXYCODONE HYDROCHLORIDE 5 MG/1
5 TABLET ORAL
Status: DISCONTINUED | OUTPATIENT
Start: 2022-04-18 | End: 2022-04-18

## 2022-04-18 RX ORDER — ONDANSETRON 2 MG/ML
INJECTION INTRAMUSCULAR; INTRAVENOUS AS NEEDED
Status: DISCONTINUED | OUTPATIENT
Start: 2022-04-18 | End: 2022-04-18 | Stop reason: HOSPADM

## 2022-04-18 RX ORDER — OXYTOCIN/RINGER'S LACTATE 30/500 ML
10 PLASTIC BAG, INJECTION (ML) INTRAVENOUS AS NEEDED
Status: DISCONTINUED | OUTPATIENT
Start: 2022-04-18 | End: 2022-04-20 | Stop reason: HOSPADM

## 2022-04-18 RX ORDER — SODIUM CHLORIDE 0.9 % (FLUSH) 0.9 %
5-40 SYRINGE (ML) INJECTION AS NEEDED
Status: DISCONTINUED | OUTPATIENT
Start: 2022-04-18 | End: 2022-04-18

## 2022-04-18 RX ORDER — FOLIC ACID/MULTIVIT,IRON,MINER 0.4MG-18MG
1 TABLET ORAL DAILY
Status: DISCONTINUED | OUTPATIENT
Start: 2022-04-18 | End: 2022-04-20 | Stop reason: HOSPADM

## 2022-04-18 RX ORDER — NALOXONE HYDROCHLORIDE 0.4 MG/ML
0.4 INJECTION, SOLUTION INTRAMUSCULAR; INTRAVENOUS; SUBCUTANEOUS AS NEEDED
Status: DISCONTINUED | OUTPATIENT
Start: 2022-04-18 | End: 2022-04-20 | Stop reason: HOSPADM

## 2022-04-18 RX ORDER — FAMOTIDINE 10 MG/ML
INJECTION INTRAVENOUS AS NEEDED
Status: DISCONTINUED | OUTPATIENT
Start: 2022-04-18 | End: 2022-04-18 | Stop reason: HOSPADM

## 2022-04-18 RX ORDER — NALOXONE HYDROCHLORIDE 0.4 MG/ML
0.1 INJECTION, SOLUTION INTRAMUSCULAR; INTRAVENOUS; SUBCUTANEOUS
Status: DISCONTINUED | OUTPATIENT
Start: 2022-04-18 | End: 2022-04-18

## 2022-04-18 RX ORDER — KETOROLAC TROMETHAMINE 30 MG/ML
30 INJECTION, SOLUTION INTRAMUSCULAR; INTRAVENOUS
Status: ACTIVE | OUTPATIENT
Start: 2022-04-18 | End: 2022-04-19

## 2022-04-18 RX ORDER — BUPIVACAINE HYDROCHLORIDE 7.5 MG/ML
INJECTION, SOLUTION EPIDURAL; RETROBULBAR AS NEEDED
Status: DISCONTINUED | OUTPATIENT
Start: 2022-04-18 | End: 2022-04-18 | Stop reason: HOSPADM

## 2022-04-18 RX ORDER — OXYTOCIN/RINGER'S LACTATE 30/500 ML
87.3 PLASTIC BAG, INJECTION (ML) INTRAVENOUS AS NEEDED
Status: DISCONTINUED | OUTPATIENT
Start: 2022-04-18 | End: 2022-04-20 | Stop reason: HOSPADM

## 2022-04-18 RX ADMIN — SODIUM CHLORIDE, POTASSIUM CHLORIDE, SODIUM LACTATE AND CALCIUM CHLORIDE 125 ML/HR: 600; 310; 30; 20 INJECTION, SOLUTION INTRAVENOUS at 09:19

## 2022-04-18 RX ADMIN — Medication 10 MG: at 08:17

## 2022-04-18 RX ADMIN — GLYCOPYRROLATE 0.2 MG: 0.2 INJECTION INTRAMUSCULAR; INTRAVENOUS at 08:22

## 2022-04-18 RX ADMIN — DEXAMETHASONE SODIUM PHOSPHATE 4 MG: 4 INJECTION, SOLUTION INTRAMUSCULAR; INTRAVENOUS at 08:19

## 2022-04-18 RX ADMIN — SODIUM CHLORIDE, POTASSIUM CHLORIDE, SODIUM LACTATE AND CALCIUM CHLORIDE: 600; 310; 30; 20 INJECTION, SOLUTION INTRAVENOUS at 08:15

## 2022-04-18 RX ADMIN — IBUPROFEN 800 MG: 800 TABLET, FILM COATED ORAL at 18:07

## 2022-04-18 RX ADMIN — SIMETHICONE 80 MG: 80 TABLET, CHEWABLE ORAL at 18:08

## 2022-04-18 RX ADMIN — SODIUM CHLORIDE, POTASSIUM CHLORIDE, SODIUM LACTATE AND CALCIUM CHLORIDE 1000 ML: 600; 310; 30; 20 INJECTION, SOLUTION INTRAVENOUS at 06:35

## 2022-04-18 RX ADMIN — MORPHINE SULFATE 150 MCG: 0.5 INJECTION, SOLUTION EPIDURAL; INTRATHECAL; INTRAVENOUS at 07:43

## 2022-04-18 RX ADMIN — CEFAZOLIN 1 G: 1 INJECTION, POWDER, FOR SOLUTION INTRAMUSCULAR; INTRAVENOUS at 13:52

## 2022-04-18 RX ADMIN — CEFAZOLIN 1 G: 1 INJECTION, POWDER, FOR SOLUTION INTRAMUSCULAR; INTRAVENOUS at 08:00

## 2022-04-18 RX ADMIN — IBUPROFEN 800 MG: 800 TABLET, FILM COATED ORAL at 10:04

## 2022-04-18 RX ADMIN — DOCUSATE SODIUM 100 MG: 100 CAPSULE, LIQUID FILLED ORAL at 10:04

## 2022-04-18 RX ADMIN — ONDANSETRON HYDROCHLORIDE 4 MG: 2 SOLUTION INTRAMUSCULAR; INTRAVENOUS at 07:37

## 2022-04-18 RX ADMIN — FAMOTIDINE 20 MG: 10 INJECTION INTRAVENOUS at 07:39

## 2022-04-18 RX ADMIN — SODIUM CHLORIDE, POTASSIUM CHLORIDE, SODIUM LACTATE AND CALCIUM CHLORIDE 1000 ML: 600; 310; 30; 20 INJECTION, SOLUTION INTRAVENOUS at 05:54

## 2022-04-18 RX ADMIN — Medication 10 MG: at 07:45

## 2022-04-18 RX ADMIN — OXYTOCIN 40 UNITS: 10 INJECTION, SOLUTION INTRAMUSCULAR; INTRAVENOUS at 08:13

## 2022-04-18 RX ADMIN — BUPIVACAINE HYDROCHLORIDE 1.8 ML: 7.5 INJECTION, SOLUTION EPIDURAL; RETROBULBAR at 07:43

## 2022-04-18 RX ADMIN — DOCUSATE SODIUM 100 MG: 100 CAPSULE, LIQUID FILLED ORAL at 18:07

## 2022-04-18 RX ADMIN — FENTANYL CITRATE 10 MCG: 50 INJECTION, SOLUTION INTRAMUSCULAR; INTRAVENOUS at 07:43

## 2022-04-18 RX ADMIN — SODIUM CHLORIDE, POTASSIUM CHLORIDE, SODIUM LACTATE AND CALCIUM CHLORIDE 1000 ML: 600; 310; 30; 20 INJECTION, SOLUTION INTRAVENOUS at 07:17

## 2022-04-18 RX ADMIN — CEFAZOLIN SODIUM 2 G: 1 POWDER, FOR SOLUTION INTRAMUSCULAR; INTRAVENOUS at 07:42

## 2022-04-18 RX ADMIN — ONDANSETRON 4 MG: 4 TABLET, ORALLY DISINTEGRATING ORAL at 09:40

## 2022-04-18 RX ADMIN — ONDANSETRON 4 MG: 4 TABLET, ORALLY DISINTEGRATING ORAL at 15:05

## 2022-04-18 RX ADMIN — SODIUM CHLORIDE, POTASSIUM CHLORIDE, SODIUM LACTATE AND CALCIUM CHLORIDE: 600; 310; 30; 20 INJECTION, SOLUTION INTRAVENOUS at 08:59

## 2022-04-18 NOTE — ANESTHESIA PREPROCEDURE EVALUATION
Relevant Problems   No relevant active problems       Anesthetic History   No history of anesthetic complications  PONV   Pertinent negatives: No history of awareness of surgery under anesthesia  Comments: c section nausea, spanal wore off too early, spinal lheadach     Review of Systems / Medical History  Patient summary reviewed, nursing notes reviewed and pertinent labs reviewed    Pulmonary  Within defined limits                 Neuro/Psych   Within defined limits           Cardiovascular  Within defined limits                  Comments: Not on Beta Blocker   GI/Hepatic/Renal  Within defined limits              Endo/Other  Within defined limits           Other Findings              Physical Exam    Airway  Mallampati: II  TM Distance: 4 - 6 cm  Neck ROM: normal range of motion   Mouth opening: Normal     Cardiovascular  Regular rate and rhythm,  S1 and S2 normal,  no murmur, click, rub, or gallop  Rhythm: regular  Rate: normal         Dental  No notable dental hx       Pulmonary  Breath sounds clear to auscultation               Abdominal  GI exam deferred       Other Findings            Anesthetic Plan    ASA: 2  Anesthesia type: spinal      Post-op pain plan if not by surgeon: intrathecal opiates      Anesthetic plan and risks discussed with: Patient

## 2022-04-18 NOTE — PROGRESS NOTES
4/18/2022  1:11 PM    CM met with DANE to complete initial assessment and begin discharge planning. MOB verified and confirmed demographics. DANE lives with YOUNG- Ervin Cowart ( [phone]), along with their 9,6,3  at the address on file. DANE does not work and plans to be home with infant. YOUNG is also employed and will be taking about 8wks off. DANE reports she has good family support, and feels like she has the support she needs when she returns home. DANE plans to breast feed baby and has pump to use at home. Dr. Azalea Ansari will provide follow up care for infant. DANE has car seat, bassinet/crib, clothing, bottles and all necessary supplies for baby. DANE has ROSTR, and will be adding baby to this policy. CM discussed process to add baby to insurance, MOB verbalized understanding. Care Management Interventions  PCP Verified by CM:  Yes (Clemente)  Transition of Care Consult (CM Consult): Discharge Planning  Support Systems: Other Family Member(s),Spouse/Significant Other  Confirm Follow Up Transport: Family  Discharge Location  Patient Expects to be Discharged to[de-identified] Home with family assistance  Marycruz Turk

## 2022-04-18 NOTE — PROGRESS NOTES
Roland.Joanie Cornejo at bedside to discuss POC.    8154 Delivery of viable infant via . 1884 Returned to room at 0903. Denies pain, breast feeding infant at this time    8232 Patient reports one wave of nausea that has since resolved. Would like zofran for prophylaxis against nausea. Donna Payan MD made aware by dominique Garcia with patient receiving zofran odt. 1145     1155 TRANSFER - OUT REPORT:    Verbal report given to Malgorzata Dennis MIU nurse(name) on Arthor Reed  being transferred to MIU(unit) for routine progression of care       Report consisted of patients Situation, Background, Assessment and   Recommendations(SBAR). Information from the following report(s) SBAR, Kardex, [facility] and Recent Results was reviewed with the receiving nurse. Lines:   Peripheral IV 22 Distal;Left;Posterior Forearm (Active)   Site Assessment Clean, dry, & intact 22 0908   Phlebitis Assessment 0 22 0908   Infiltration Assessment 0 22 0908   Dressing Status Clean, dry, & intact 22 0908   Dressing Type Transparent;Tape 22 0908   Hub Color/Line Status Pink;Patent 22 0908   Action Taken Open ports on tubing capped 22 09   Alcohol Cap Used Yes 22 09        Opportunity for questions and clarification was provided.       Patient transported with:   Registered Nurse

## 2022-04-18 NOTE — OP NOTES
Section Operative Report       Patient: Michelle Mandujano MRN: [id number]  SSN: [id number]    YOB: 1985  Age: 39 y.o. Sex: female       Date of Procedure: 2022     Preoperative Diagnosis:   1. Pregnant at 39.0 weeks [Z3A.39]  2. Previous  delivery affecting pregnancy [O34.219]  3. AMA; Multiparous; 3rd trimester    Postoperative Diagnosis:   1. Pregnant at 39.0 weeks [Z3A.39]  2. Previous  delivery affecting pregnancy [O34.219]  3. AMA; Multiparous; 3rd trimester    Procedure: Repeat Low Transverse  SECTION via Pfannenstiel skin incision    Surgeon(s):  Valorie Jerez MD  Assistant:   Ananya Wasserman. GITA Saleh    Anesthesia: Spinal; Dr. Huy Way    Estimated Blood Loss : 400ml     Findings:   Information for the patient's :  Torie Simon [[id number]]   Delivery of a 3.23 kg female infant on 2022 at 8:12 AM. Apgars were 9  and 9 . Umbilical Cord: 3 Vessels     Umbilical Cord Events: Nuchal Cord Without Compressions     Placenta: Expressed removal with Normal appearance. Amniotic Fluid Volume: Moderate     Amniotic Fluid Description:  Clear       Specimens: * No specimens in log *            Complications:  none    Procedure Details:    After informed consent was obtained, the patient was administered pre-operative antibiotics and taken to the operating room, where Spinal anesthesia was administered and found to be adequate. Dupree catheter had been placed using sterile technique. The patient was prepped and draped in the usual sterile fashion. After testing for adequate anesthesia, a Pfannenstiel incision was made with a scalpel and carried down to the anterior rectus fascia with the Bovie. The fascia was nicked in the midline and the incision in the fascia was extended laterally with the Bovie.   The inferior aspect of the fascial incision was grasped with Kocher clamps, tented up, and the rectus muscles were  from the fascial sheath both bluntly and sharply with Wilburn scissors. The superior aspect of the fascial incision in similar fashion was tented up and the rectus muscles were dissected off first bluntly and then sharply with Bovie electrocautery. The muscles were then parted in the midline, the peritoneum was entered sharply with Metzenbaum scissors and stretched. The bladder blade was then inserted. The vesicouterine peritoneum was identified and entered sharply with Metzenbaum scissors and the incision was extended laterally with the scissors. The bladder flap was then created digitally. An Robert retractor was placed in the abdomen with care taken to ensure of no bowel entrapment. A low transverse uterine incision was made with the scalpel and extended laterally with blunt finger dissection. Amniotomy was performed. The babys head was then delivered atraumatically followed by the remainder of the body. The nose and mouth were suctioned. The cord was clamped and cut and the baby was handed off to the waiting staff. Cord blood was sent for analysis. The placenta was then manually extracted from the uterus. The uterus was thoroughly cleared of all clots and debris using a moist lap sponge. The uterine incision was closed with number 0-PDS in a running locked fashion with a second layer of 0-PDS used to imbricate the incision. Excellent hemostasis was noted. Both lateral gutters were irrigated with warm normal saline and good hemostasis was again reassured throughout, including the hysterotomy, bladder flap, rectus muscles and posterior surface of the fascia. Seprafilm was placed over the PRADEEP and anterior surface of the uterus. The peritoneum and rectus muscles were loosely re-approximated with 2-0 Monocryl. The fascia was closed with 0-PDS in a running fashion. Good hemostasis was assured. The subcutaneous space was re-approximated with 5 interupted 2-0 Monocryl stitches.  The skin was closed with a 4-0 Monocryl in a subcuticular fashion. Dermabond was placed over the incision. The patient tolerated the procedure well. Sponge, lap, and needle counts were correct times three and the patient and baby were taken to recovery room in stable condition.     Signed By: Riley Rosado MD     April 18, 2022

## 2022-04-18 NOTE — PROGRESS NOTES
0700 Bedside and Verbal shift change report given to Baron Galindo RN (oncoming nurse) by HENNA Torres RN (offgoing nurse). Report included the following information SBAR, MAR and Med Rec Status. 0720 This RN asked pt if she can tolerate iodine r/t shellfish allergy.  Pt declined issues with iodine in the past.     0726 Paoloni    0735 Pt up to void    1044 This RN emptied sen    1005 Pt vomited

## 2022-04-18 NOTE — L&D DELIVERY NOTE
Delivery Summary    Patient: Michelle Mandujano MRN: [id number]  SSN: [id number]    YOB: 1985  Age: 39 y.o. Sex: female        Information for the patient's :  Toriechristopher Simon [[id number]]       Labor Events:    Labor: No    Steroids: None   Cervical Ripening Date/Time:       Cervical Ripening Type:     Antibiotics During Labor:     Rupture Identifier: Sac 1    Rupture Date/Time: 2022 8:11 AM   Rupture Type: AROM   Amniotic Fluid Volume: Moderate    Amniotic Fluid Description: Clear    Amniotic Fluid Odor: None    Induction: None       Induction Date/Time:        Indications for Induction:      Augmentation: None   Augmentation Date/Time:      Indications for Augmentation:     Labor complications: None       Additional complications:        Delivery Events:  Estimated Blood Loss (ml):  400ml   Quantitaive Blood Loss (ml):             Delivery Date: 2022    Delivery Time: 8:12 AM   Delivery Type: , Low Transverse     Details    Trial of Labor: No   Primary/Repeat: Repeat   Priority: Routine   Indications:  Prior Uterine Surgery       Sex:  Female     Gestational Age: 39w0d  Delivery Clinician:  Marah Piper  Living Status: Living   Delivery Location: L&D OR 2          APGARS  One minute Five minutes Ten minutes   Skin color: 1   1        Heart rate: 2   2        Grimace: 2   2        Muscle tone: 2   2        Breathin   2        Totals: 9   9          Presentation: Vertex    Position:        Resuscitation Method:  Suctioning-bulb; Tactile Stimulation     Meconium Stained: None      Cord Information: 3 Vessels  Complications: Nuchal Cord Without Compressions  Cord around: head  Delayed cord clamping?  Yes  Cord clamped date/time:2022  8:13 AM  Disposition of Cord Blood: Lab    Blood Gases Sent?: No    Placenta:  Date/Time: 2022  8:15 AM  Removal: Expressed      Appearance: Normal     [place] Measurements:  Birth Weight: 3.23 kg Birth Length: 50.8 cm      Head Circumference: 36.5 cm      Chest Circumference: 32 cm     Abdominal Girth: 31.5 cm    Other Providers:   FORTINO GRUBER;PAYTON SALAS;DIALLO FAROOQ;MICK BLUM;HOWARD AU;DURGA HOROWITZ;LANCE SETH;ALEXIS MENA, Obstetrician;Primary Nurse;Primary [place] Nurse; Anesthesiologist;Primary Nurse;Scrub Tech;Scrub Tech;Staff Nurse     Group B Strep:   Lab Results   Component Value Date/Time    GrBStrep, External negative 2022 12:00 AM     Information for the patient's :  Danica Ghotra [[id number]]   No results found for: Sharon Ramírez, ZAINA, SELENA, ABORHEXT, [address]

## 2022-04-18 NOTE — PROGRESS NOTES
1900 - Bedside shift change report given to ERIC Thakkar RN (oncoming nurse) by Savana Degroot RNRITO (offgoing nurse). Report included the following information SBAR, Intake/Output, MAR and Recent Results.

## 2022-04-18 NOTE — H&P
History & Physical    Name: Kevin Whipple MRN: [id number]  SSN: [id number]    YOB: 1985  Age: 39 y.o. Sex: female      Subjective:     Estimated Date of Delivery: 22  OB History        4    Para   3    Term   3            AB        Living   3       SAB        IAB        Ectopic        Molar        Multiple   0    Live Births   3          Obstetric Comments   Dr. [facility]             Ms. Paul Zimmerman admitted with pregnancy at 39w0d for  section due to previous  section. Prenatal course was complicated by advanced maternal age and prior LTCS x2. Please see prenatal records for details. Past Medical History:   Diagnosis Date    Complication of anesthesia 2019    Spinal headache    Heart abnormality     tachycardia    PONV (postoperative nausea and vomiting)     Tachycardia     Unspecified breast disorder 2015    lumpectomy  right breast cyst    Unspecified breast disorder     axillary node dissection    Unspecified breast disorder     left breast biopsy    Unspecified breast disorder     BRCA test negative    Uterine prolapse affecting pregnancy in second trimester, antepartum 9/3/2015    1 degree cystocele and descent of cervix to introitus with prolonged standing and straining.  Fitted with #4 Ring pessary with support     Past Surgical History:   Procedure Laterality Date    HX ACL RECONSTRUCTION Right     HX  SECTION      x 2    HX OTHER SURGICAL      right knee surgery    HX OTHER SURGICAL      diviated septum repair    HX TONSILLECTOMY      TN BREAST SURGERY PROCEDURE UNLISTED       Social History     Occupational History    Occupation: Stay at home mom     Comment: retired NICU nurse   Tobacco Use    Smoking status: Never Smoker    Smokeless tobacco: Never Used   Substance and Sexual Activity    Alcohol use: No    Drug use: Never    Sexual activity: Yes     Partners: Male     Birth control/protection: None     Family History   Problem Relation Age of Onset    Breast Cancer Mother 45         age 36    Breast Cancer Maternal Grandmother 36        reoccured 2-3 times    Other Father 36        parkinsons    Other Brother         bipolar    Hypertension Maternal Grandfather 46        MI    Diabetes Paternal Grandmother 66        unsure of diabete type; liver disease       Allergies   Allergen Reactions    Shellfish Derived Anaphylaxis    Soy Nausea and Vomiting     Prior to Admission medications    Medication Sig Start Date End Date Taking? Authorizing Provider   fluticasone propionate (FLOVENT HFA) 110 mcg/actuation inhaler Take 2 Puffs by inhalation. Yes Provider, Historical   Cetirizine (ZyrTEC) 10 mg cap Take  by mouth. Yes Provider, Historical   LACTOBACILLUS ACIDOPHILUS (PROBIOTIC PO) Take  by mouth. Yes Provider, Historical   prenatal vit-iron fumarate-fa (PRENATAL VITAMIN WITH MINERALS) 28-0.8 mg tab Take 1 tablet by mouth daily,   Yes Provider, Historical   ascorbic acid, vitamin C, (Vitamin C) 250 mg tablet Take  by mouth. Patient not taking: Reported on 2022    Provider, Historical   cholecalciferol (Vitamin D3) (1000 Units /25 mcg) tablet Take  by mouth daily. Patient not taking: Reported on 2022    Provider, Historical   co-enzyme Q-10 (CO Q-10) 100 mg capsule Take 100 mg by mouth daily. Patient not taking: Reported on 2022    Provider, Historical        Review of Systems: A comprehensive review of systems was negative. Objective:     Vitals:  Vitals:    22 0617 22 0622 22 0627 22 0714   BP:    (!) 115/58   Pulse:    89   Resp:    16   Temp:    98.3 °F (36.8 °C)   SpO2: 99% 98% 99% 100%   Weight:       Height:            Physical Exam:  Patient without distress.   Heart: Regular rate and rhythm or S1S2 present  Lung: clear to auscultation throughout lung fields, no wheezes, no rales, no rhonchi and normal respiratory effort  Abdomen: soft, nontender  Fundus: soft and non tender  Lower Extremities:  - Edema No  Membranes:  Intact  Fetal Heart Rate: Reactive  Baseline: 135 per minute  Variability: moderate  Accelerations: yes  Decelerations: none    Prenatal Labs:   Lab Results   Component Value Date/Time    ABO/Rh(D) O POSITIVE 2022 06:00 AM    Rubella, External Immune 2018 12:00 AM    GrBStrep, External Negative 04/10/2019 12:00 AM    HBsAg, External Negative 2018 12:00 AM    HIV, External Non reactive 2018 12:00 AM    RPR, External Negative 2012 12:00 AM    Gonorrhea, External Negative 2018 12:00 AM    Chlamydia, External Negative 2018 12:00 AM    ABO,Rh o positive 2012 12:00 AM       Impression/Plan:     Plan:    Admit for  section. Group B Strep was negative. She is not anemic. Discussed the risks of surgery including the risks of bleeding, infection, deep vein thrombosis, and surgical injuries to internal organs including but not limited to the bowels, bladder, rectum, and female reproductive organs. The patient understands the risks; any and all questions were answered to the patient's satisfaction.     Signed By:  Marah Piper MD     2022

## 2022-04-18 NOTE — ANESTHESIA PROCEDURE NOTES
Spinal Block    Performed by: Kallie Hall MD  Authorized by: Kallie Hall MD     Pre-procedure:   Indications: at surgeon's request and primary anesthetic  Preanesthetic Checklist: patient identified, risks and benefits discussed, anesthesia consent and timeout performed      Spinal Block:   Patient Position:  Seated  Prep Region:  Lumbar  Prep: chlorhexidine      Location:  L3-4  Technique:  Single shot        Needle:   Needle Type:  Pencan  Needle Gauge:  27 G  Attempts:  1      Events: CSF confirmed, no blood with aspiration and no paresthesia        Assessment:  Insertion:  Uncomplicated  Patient tolerance:  Patient tolerated the procedure well with no immediate complications

## 2022-04-18 NOTE — ANESTHESIA POSTPROCEDURE EVALUATION
Procedure(s):   SECTION.     spinal    Anesthesia Post Evaluation      Multimodal analgesia: multimodal analgesia used between 6 hours prior to anesthesia start to PACU discharge  Patient location during evaluation: PACU  Patient participation: complete - patient participated  Level of consciousness: awake and alert  Airway patency: patent  Anesthetic complications: no  Cardiovascular status: acceptable  Respiratory status: acceptable  Hydration status: acceptable        INITIAL Post-op Vital signs:   Vitals Value Taken Time   /55 22 1003   Temp 36.3 °C (97.4 °F) 22 0907   Pulse 63 22 1003   Resp 18 22 0937   SpO2 98 % 22 1008

## 2022-04-19 LAB
BASOPHILS # BLD: 0 K/UL (ref 0–0.1)
BASOPHILS NFR BLD: 0 % (ref 0–1)
DIFFERENTIAL METHOD BLD: ABNORMAL
EOSINOPHIL # BLD: 0.1 K/UL (ref 0–0.4)
EOSINOPHIL NFR BLD: 1 % (ref 0–7)
ERYTHROCYTE [DISTWIDTH] IN BLOOD BY AUTOMATED COUNT: 13.3 % (ref 11.5–14.5)
HCT VFR BLD AUTO: 32.6 % (ref 35–47)
HGB BLD-MCNC: 10.9 G/DL (ref 11.5–16)
IMM GRANULOCYTES # BLD AUTO: 0.2 K/UL (ref 0–0.04)
IMM GRANULOCYTES NFR BLD AUTO: 2 % (ref 0–0.5)
LYMPHOCYTES # BLD: 2 K/UL (ref 0.8–3.5)
LYMPHOCYTES NFR BLD: 19 % (ref 12–49)
MCH RBC QN AUTO: 31.6 PG (ref 26–34)
MCHC RBC AUTO-ENTMCNC: 33.4 G/DL (ref 30–36.5)
MCV RBC AUTO: 94.5 FL (ref 80–99)
MONOCYTES # BLD: 1 K/UL (ref 0–1)
MONOCYTES NFR BLD: 10 % (ref 5–13)
NEUTS SEG # BLD: 7.4 K/UL (ref 1.8–8)
NEUTS SEG NFR BLD: 68 % (ref 32–75)
NRBC # BLD: 0 K/UL (ref 0–0.01)
NRBC BLD-RTO: 0 PER 100 WBC
PLATELET # BLD AUTO: 175 K/UL (ref 150–400)
PMV BLD AUTO: 10.7 FL (ref 8.9–12.9)
RBC # BLD AUTO: 3.45 M/UL (ref 3.8–5.2)
WBC # BLD AUTO: 10.7 K/UL (ref 3.6–11)

## 2022-04-19 PROCEDURE — 74011250637 HC RX REV CODE- 250/637: Performed by: OBSTETRICS & GYNECOLOGY

## 2022-04-19 PROCEDURE — 65270000029 HC RM PRIVATE

## 2022-04-19 PROCEDURE — 77030036554

## 2022-04-19 PROCEDURE — 2709999900 HC NON-CHARGEABLE SUPPLY

## 2022-04-19 PROCEDURE — 85025 COMPLETE CBC W/AUTO DIFF WBC: CPT

## 2022-04-19 PROCEDURE — 36415 COLL VENOUS BLD VENIPUNCTURE: CPT

## 2022-04-19 RX ADMIN — DOCUSATE SODIUM 100 MG: 100 CAPSULE, LIQUID FILLED ORAL at 18:41

## 2022-04-19 RX ADMIN — IBUPROFEN 800 MG: 800 TABLET, FILM COATED ORAL at 02:45

## 2022-04-19 RX ADMIN — DOCUSATE SODIUM 100 MG: 100 CAPSULE, LIQUID FILLED ORAL at 08:51

## 2022-04-19 RX ADMIN — IBUPROFEN 800 MG: 800 TABLET, FILM COATED ORAL at 10:46

## 2022-04-19 RX ADMIN — IBUPROFEN 800 MG: 800 TABLET, FILM COATED ORAL at 18:41

## 2022-04-19 NOTE — LACTATION NOTE
This note was copied from a baby's chart. [facility] making rounds on infant and mother. Pt states that breastfeeding has been going well and she has successfully  3 other babies. No further questions or lactation assistance needed at this time, however if she does need anything she will call and ask for the [facility]. Breastfeeding booklet provided. Reviewed breastfeeding basics:  How milk is made and normal  breastfeeding behaviors discussed. Supply and demand,  stomach size, early feeding cues, skin to skin bonding with comfortable positioning and baby led latch-on reviewed. How to identify signs of successful breastfeeding sessions reviewed; education on assymetrical latch, signs of effective latching vs shallow, in-effective latching, normal  feeding frequency and duration and expected infant output discussed. Normal course of breastfeeding discussed including the AAP's recommendation that children receive exclusive breast milk feedings for the first six months of life with breast milk feedings to continue through the first year of life and/or beyond as complimentary table foods are added. Breastfeeding Booklet and Warm line information provided with discussion. Discussed typical  weight loss and the importance of pediatrician appointment within 24-48 hours of discharge, at 2 weeks of life and normalcy of requesting pediatric weight checks as needed in between visits. Pt will successfully establish breastfeeding by feeding in response to early feeding cues or wake every 3h, will obtain deep latch, and will keep log of feedings/output. Taught to BF at hunger cues and or q 2-3 hrs and to offer 10-20 drops of hand expressed colostrum at any non-feeds.       Breast Assessment  Left Breast: Medium  Left Nipple: Everted,Intact  Right Breast: Medium  Right Nipple: Everted,Intact  Breast- Feeding Assessment  Breast-Feeding Experience: Yes  Breast Trauma/Surgery: No  Type/Quality: Good  Lactation Consultant Visits  Breast-Feedings: Good      LATCH Documentation  Latch: Grasps breast, tongue down, lips flanged, rhythmic sucking  Audible Swallowing: Spontaneous and intermittent (24 hours old)  Type of Nipple: Everted (after stimulation)  Comfort (Breast/Nipple): Soft/non-tender  Hold (Positioning): No assist from staff, mother able to position/hold infant  LATCH Score: 10

## 2022-04-19 NOTE — ROUTINE PROCESS
Bedside and Verbal shift change report given to ERIC Berman RN (oncoming nurse) by Saida Huang RN (offgoing nurse). Report included the following information SBAR, Kardex, Intake/Output, MAR and Recent Results.

## 2022-04-19 NOTE — PROGRESS NOTES
Post-Operative  Day 1    Arthor Reed         Information for the patient's :  Aubrey West [[id number]]   , Low Transverse      Patient doing well without unusual complaints. Ambulates, voids, and tolerates Reg diet. Nausea resolved. No HAs. Breast feeding well. Vitals:  Visit Vitals  /65 (BP 1 Location: Right upper arm, BP Patient Position: At rest)   Pulse 83   Temp 98.3 °F (36.8 °C)   Resp 18   Ht 5' 7\" (1.702 m)   Wt 74.8 kg (165 lb)   LMP 2021   SpO2 96%   Breastfeeding Unknown   BMI 25.84 kg/m²     Temp (24hrs), Av.1 °F (36.7 °C), Min:97.4 °F (36.3 °C), Max:98.4 °F (36.9 °C)      Last 24hr Input/Output:    Intake/Output Summary (Last 24 hours) at 2022 0832  Last data filed at 2022 2251  Gross per 24 hour   Intake    Output 3847 ml   Net -3847 ml      Exam:       Patient without distress. Abdomen:soft, expected no tenderness, fundus firm @U-2  Inc: Dermabond c/d/i  Lower extremities are no tenderness with trace edema.     Labs:   Lab Results   Component Value Date/Time    WBC 10.7 2022 02:52 AM    WBC 8.9 2022 06:00 AM    HGB 10.9 (L) 2022 02:52 AM    HGB 12.0 2022 06:00 AM    HCT 32.6 (L) 2022 02:52 AM    HCT 36.3 2022 06:00 AM    PLATELET 153 4795 02:52 AM    PLATELET 035  06:00 AM       Recent Results (from the past 24 hour(s))   CBC WITH AUTOMATED DIFF    Collection Time: 22  2:52 AM   Result Value Ref Range    WBC 10.7 3.6 - 11.0 K/uL    RBC 3.45 (L) 3.80 - 5.20 M/uL    HGB 10.9 (L) 11.5 - 16.0 g/dL    HCT 32.6 (L) 35.0 - 47.0 %    MCV 94.5 80.0 - 99.0 FL    MCH 31.6 26.0 - 34.0 PG    MCHC 33.4 30.0 - 36.5 g/dL    RDW 13.3 11.5 - 14.5 %    PLATELET 888 996 - 085 K/uL    MPV 10.7 8.9 - 12.9 FL    NRBC 0.0 0  WBC    ABSOLUTE NRBC 0.00 0.00 - 0.01 K/uL    NEUTROPHILS 68 32 - 75 %    LYMPHOCYTES 19 12 - 49 %    MONOCYTES 10 5 - 13 %    EOSINOPHILS 1 0 - 7 %    BASOPHILS 0 0 - 1 % IMMATURE GRANULOCYTES 2 (H) 0.0 - 0.5 %    ABS. NEUTROPHILS 7.4 1.8 - 8.0 K/UL    ABS. LYMPHOCYTES 2.0 0.8 - 3.5 K/UL    ABS. MONOCYTES 1.0 0.0 - 1.0 K/UL    ABS. EOSINOPHILS 0.1 0.0 - 0.4 K/UL    ABS. BASOPHILS 0.0 0.0 - 0.1 K/UL    ABS. IMM. GRANS. 0.2 (H) 0.00 - 0.04 K/UL    DF AUTOMATED         Assessment: Post-Op day 1, stable; O+/RI/XX \"[place]\"    Plan:   1. Routine post-operative care   2.

## 2022-04-19 NOTE — DISCHARGE SUMMARY
Patient ID:  Michelle Mandujano  [id number]  77 y.o.  1985    Admit Date: 2022    Discharge Date: 2022     Admitting Physician: Marah Piper MD  Attending Physician: Valorie Jerez MD    Admission Diagnoses:   44 weeks gestation of pregnancy [Z3A.39]  Previous  delivery affecting pregnancy [O34.219]  AMA (advanced maternal age) multigravida 35+, third trimester [O09.523]    Procedures for this admission: Repeat Low Transverse [address] Course: Uncomplicated OR and MIU course. Discharge Diagnoses: Same as above with R LTCS producing a viable infant. Information for the patient's :  Torie Simon [[id number]]            Discharge Disposition:  Home    Discharge Condition:  Good    Additional Diagnoses: advanced maternal age and previous . Maternal Labs:   Lab Results   Component Value Date/Time    HBsAg, External negative 2021 12:00 AM    HIV, External Non reactive 2018 12:00 AM    Rubella, External immune 2021 12:00 AM    RPR, External non reactive 2021 12:00 AM    GrBStrep, External negative 2022 12:00 AM       Cord Blood Results:   Information for the patient's :  Torie Simon [[id number]]     Lab Results   Component Value Date/Time    ABO/Rh(D) O NEGATIVE 2022 08:30 AM    SARA IgG NEG 2022 08:30 AM    Bilirubin if SARA pos: IF DIRECT CHET POSITIVE, BILIRUBIN TO FOLLOW 2022 08:30 AM            History of Present Illness:   OB History        4    Para   4    Term   4            AB        Living   4       SAB        IAB        Ectopic        Molar        Multiple   0    Live Births   4          Obstetric Comments   Dr. [facility]           Admitted for  Section. Hospital Course:   Patient was admitted as above and delivered via R lTCS. Please the chart for details. The postpartum course was unremarkable.   She was deemed stable for discharge home on day 2.     Follow up with Dr. Quinn Pruett MD in 2 weeks        Signed:  Quinn Pruett MD  4/18/2022  8:05 PM

## 2022-04-20 VITALS
RESPIRATION RATE: 16 BRPM | BODY MASS INDEX: 25.9 KG/M2 | OXYGEN SATURATION: 98 % | DIASTOLIC BLOOD PRESSURE: 72 MMHG | SYSTOLIC BLOOD PRESSURE: 111 MMHG | WEIGHT: 165 LBS | HEART RATE: 74 BPM | HEIGHT: 67 IN | TEMPERATURE: 97.5 F

## 2022-04-20 PROCEDURE — 74011250637 HC RX REV CODE- 250/637: Performed by: OBSTETRICS & GYNECOLOGY

## 2022-04-20 RX ORDER — HYDROCODONE BITARTRATE AND ACETAMINOPHEN 5; 325 MG/1; MG/1
1 TABLET ORAL
Qty: 12 TABLET | Refills: 0 | Status: SHIPPED | OUTPATIENT
Start: 2022-04-20 | End: 2022-04-23

## 2022-04-20 RX ORDER — IBUPROFEN 800 MG/1
800 TABLET ORAL EVERY 8 HOURS
Qty: 40 TABLET | Refills: 1 | Status: SHIPPED | OUTPATIENT
Start: 2022-04-20

## 2022-04-20 RX ADMIN — IBUPROFEN 800 MG: 800 TABLET, FILM COATED ORAL at 01:17

## 2022-04-20 RX ADMIN — IBUPROFEN 800 MG: 800 TABLET, FILM COATED ORAL at 10:43

## 2022-04-20 RX ADMIN — HYDROCODONE BITARTRATE AND ACETAMINOPHEN 1 TABLET: 5; 325 TABLET ORAL at 05:41

## 2022-04-20 NOTE — ROUTINE PROCESS
Patient off unit in stable condition via wheelchair with volunteers for discharge home per Dr. Andrew Barnes. Patient is to follow up in 2 weeks and is aware. Prescriptions sent to pharmacy. Patient denies any headache, dizziness, nausea/vomitting, or pain at this time. Infant in car seat with mother.

## 2022-04-20 NOTE — PROGRESS NOTES
Post-Operative  Day 2    [facility]     Information for the patient's :  Paul Mcallister [[id number]]   , Low Transverse      Patient doing well without unusual complaints. Breast feedng well. Ambulates, voids, and tolerates Regular diet. Pain well controlled. Desires discharge home today. Vitals:  Visit Vitals  /72 (BP 1 Location: Right upper arm, BP Patient Position: At rest)   Pulse 74   Temp 97.5 °F (36.4 °C)   Resp 16   Ht 5' 7\" (1.702 m)   Wt 74.8 kg (165 lb)   LMP 2021   SpO2 98%   Breastfeeding Unknown   BMI 25.84 kg/m²     Temp (24hrs), Av.9 °F (36.6 °C), Min:97.5 °F (36.4 °C), Max:98.2 °F (36.8 °C)    Exam:      Patient without distress. Abdomen: soft, expected tenderness, fundus firm                Incision clean, dry and intact; Dermabond               Lower extremities are nontender with trace edema. Labs:   Lab Results   Component Value Date/Time    WBC 10.7 2022 02:52 AM    WBC 8.9 2022 06:00 AM    HGB 10.9 (L) 2022 02:52 AM    HGB 12.0 2022 06:00 AM    HCT 32.6 (L) 2022 02:52 AM    HCT 36.3 2022 06:00 AM    PLATELET 731  02:52 AM    PLATELET 335  06:00 AM       No results found for this or any previous visit (from the past 24 hour(s)). Assessment: Post-Op day 2, doing well; O+/RI/XX \"[place]\"    Plan:   1. Routine post-operative care  2. Desires discharge home today. 3. Breast feeding well.

## 2022-04-20 NOTE — LACTATION NOTE
This note was copied from a baby's chart. Mother ambulatory in room and speaking with family on phone. Mother states BF is going well, denies questions or needs at this time. Printed discharge info given. Chart shows numerous feedings, void, stool WNL. Discussed importance of monitoring outputs and feedings on first week of life. Discussed ways to tell if baby is  getting enough breast milk, ie  voids and stools, change in color of stool, and return to birth wt within 2 weeks. Follow up with pediatrician visit for weight check in 1-2 days (per AAP guidelines.)  Encouraged to call Warm Line  [phone]  for any questions/problems that arise. Mother also given breastfeeding support group dates and times for any future needs    Engorgement Care Guidelines:  Reviewed how milk is made and normal phases of milk production. Taught care of engorged breasts - frequent breastfeeding encouraged, cool packs and motrin as tolerated. Anticipatory guidance shared. Pt will successfully establish breastfeeding by feeding in response to early feeding cues   or wake every 3h, will obtain deep latch, and will keep log of feedings/output. Taught to BF at hunger cues and or q 2-3 hrs and to offer 10-20 drops of hand expressed colostrum at any non-feeds.       Breast Assessment  Left Breast: Medium  Left Nipple: Everted,Intact  Right Breast: Medium  Right Nipple: Everted,Intact  Breast- Feeding Assessment  Breast-Feeding Experience: Yes  Breast Trauma/Surgery: No  Type/Quality: Good  Lactation Consultant Visits  Breast-Feedings: Good   Mother/Infant Observation  Mother Observation: Breast comfortable,Recognizes feeding cues

## (undated) DEVICE — SUTURE PDS II SZ 0 L36IN ABSRB VLT L40MM CT 1/2 CIR Z358T

## (undated) DEVICE — SOLUTION IV 1000ML 0.9% SOD CHL

## (undated) DEVICE — SUT CHRMC 4-0 27IN RB1 BRN --

## (undated) DEVICE — SUTURE PLN GUT SZ 2-0 L27IN ABSRB YELLOWISH TAN L70MM XLH 53T

## (undated) DEVICE — ROCKER SWITCH PENCIL HOLSTER: Brand: VALLEYLAB

## (undated) DEVICE — TIP CLEANER: Brand: VALLEYLAB

## (undated) DEVICE — SOLIDIFIER FLUID 3000 CC ABSORB

## (undated) DEVICE — ABDOMINAL PAD: Brand: DERMACEA

## (undated) DEVICE — SUTURE MCRYL 2 0 L27IN ABSRB VLT CT MFIL Y351H

## (undated) DEVICE — GOWN,SIRUS,POLYRNF,BRTHSLV,XL,30/CS: Brand: MEDLINE

## (undated) DEVICE — 3000CC GUARDIAN II: Brand: GUARDIAN

## (undated) DEVICE — POOLE SUCTION INSTRUMENT WITH REMOVABLE SHEATH: Brand: POOLE

## (undated) DEVICE — BIPOLAR FORCEPS CORD: Brand: VALLEYLAB

## (undated) DEVICE — (D)STRIP SKN CLSR 0.5X4IN WHT --

## (undated) DEVICE — PACK PROCEDURE SURG ENT CUST

## (undated) DEVICE — TRAY PREP DRY W/ PREM GLV 2 APPL 6 SPNG 2 UNDPD 1 OVERWRAP

## (undated) DEVICE — LARGE, DISPOSABLE ALEXIS O C-SECTION PROTECTOR - RETRACTOR: Brand: ALEXIS ® O C-SECTION PROTECTOR - RETRACTOR

## (undated) DEVICE — SPLINT 1524055 DOYLE II AIRWAY SET: Brand: DOYLE II ™

## (undated) DEVICE — TRAY CATH OD16FR SIL URIN M STATLOK STBL DEV SURSTP

## (undated) DEVICE — SUTURE MCRYL SZ 4-0 L27IN ABSRB UD L19MM PS-2 1/2 CIR PRIM Y426H

## (undated) DEVICE — C-SECTION II-LF: Brand: MEDLINE INDUSTRIES, INC.

## (undated) DEVICE — TOWEL,OR,DSP,ST,BLUE,STD,2/PK,40PK/CS: Brand: MEDLINE

## (undated) DEVICE — (D)PREP SKN CHLRAPRP APPL 26ML -- CONVERT TO ITEM 371833

## (undated) DEVICE — PREP SKN CHLRAPRP APL 26ML STR --

## (undated) DEVICE — SUTURE VCRL SZ 0 L36IN ABSRB VLT L36MM CT-1 1/2 CIR J346H

## (undated) DEVICE — REM POLYHESIVE ADULT PATIENT RETURN ELECTRODE: Brand: VALLEYLAB

## (undated) DEVICE — BARRIER TISS ABSRB 5X6IN 1/PK -- DIRECT ORDER ONLY NON MEDLINE

## (undated) DEVICE — STERILE POLYISOPRENE POWDER-FREE SURGICAL GLOVES: Brand: PROTEXIS

## (undated) DEVICE — SUTURE PDS II SZ 0 L60IN ABSRB VLT L48MM CTX 1/2 CIR Z990G

## (undated) DEVICE — SOL IRR SOD CL 0.9% 1000ML BTL --

## (undated) DEVICE — DEVICE TRNSF SPIK STL 2008S] MICROTEK MEDICAL INC]

## (undated) DEVICE — TRAY,URINE METER,100% SILICONE,16FR10ML: Brand: MEDLINE

## (undated) DEVICE — INFECTION CONTROL KIT SYS

## (undated) DEVICE — GLOVE SURG SZ 7 L12IN FNGR THK79MIL GRN LTX FREE

## (undated) DEVICE — KENDALL SCD EXPRESS SLEEVES, KNEE LENGTH, MEDIUM: Brand: KENDALL SCD

## (undated) DEVICE — HANDLE LT SNAP ON ULT DURABLE LENS FOR TRUMPF ALC DISPOSABLE

## (undated) DEVICE — SUTURE MCRYL SZ 0 L36IN ABSRB UD L36MM CT-1 1/2 CIR Y946H

## (undated) DEVICE — STERILE LATEX POWDER-FREE SURGICAL GLOVESWITH NITRILE COATING: Brand: PROTEXIS

## (undated) DEVICE — SUTURE VCRL SZ 2-0 L27IN ABSRB VLT L40MM CT 1/2 CIR J351H

## (undated) DEVICE — STRAP,POSITIONING,KNEE/BODY,FOAM,4X60": Brand: MEDLINE

## (undated) DEVICE — MASTISOL ADHESIVE LIQ 2/3ML

## (undated) DEVICE — COVER LT HNDL PLAS RIG 1 PER PK

## (undated) DEVICE — CANISTER, RIGID, 3000CC: Brand: MEDLINE INDUSTRIES, INC.

## (undated) DEVICE — GARMENT,MEDLINE,DVT,INT,CALF,MED, GEN2: Brand: MEDLINE

## (undated) DEVICE — BLADE 1882040 5PK INFERIOR TURB 2MM

## (undated) DEVICE — GOWN,AURORA,FABRIC-REINFORCED,X-LARGE: Brand: MEDLINE

## (undated) DEVICE — GLOVE SURG SZ 65 THK91MIL LTX FREE SYN POLYISOPRENE

## (undated) DEVICE — SUTURE PERMAHAND SZ 2-0 L18IN NONABSORBABLE BLK L26MM PS 1588H

## (undated) DEVICE — 3M™ MEDIPORE™ H SOFT CLOTH SURGICAL TAPE, 2863, 3 IN X 10 YD, 12/CASE: Brand: 3M™ MEDIPORE™

## (undated) DEVICE — ADHESIVE TISS DERMA FLEX 0.7ML -- HIGH VISCOSITY